# Patient Record
Sex: MALE | Race: WHITE | NOT HISPANIC OR LATINO | URBAN - METROPOLITAN AREA
[De-identification: names, ages, dates, MRNs, and addresses within clinical notes are randomized per-mention and may not be internally consistent; named-entity substitution may affect disease eponyms.]

---

## 2017-10-26 VITALS
HEART RATE: 68 BPM | OXYGEN SATURATION: 99 % | SYSTOLIC BLOOD PRESSURE: 125 MMHG | RESPIRATION RATE: 18 BRPM | HEIGHT: 71 IN | WEIGHT: 177.91 LBS | DIASTOLIC BLOOD PRESSURE: 80 MMHG | TEMPERATURE: 96 F

## 2017-10-26 NOTE — PATIENT PROFILE ADULT. - PMH
Acoustic neuroma    ADHD (attention deficit hyperactivity disorder)    Anxiety disorder    HTN (hypertension)

## 2017-10-27 ENCOUNTER — INPATIENT (INPATIENT)
Facility: HOSPITAL | Age: 52
LOS: 3 days | Discharge: HOME HEALTH | DRG: 27 | End: 2017-10-31
Attending: NEUROLOGICAL SURGERY | Admitting: NEUROLOGICAL SURGERY
Payer: COMMERCIAL

## 2017-10-27 DIAGNOSIS — I10 ESSENTIAL (PRIMARY) HYPERTENSION: ICD-10-CM

## 2017-10-27 DIAGNOSIS — F90.9 ATTENTION-DEFICIT HYPERACTIVITY DISORDER, UNSPECIFIED TYPE: ICD-10-CM

## 2017-10-27 DIAGNOSIS — F41.9 ANXIETY DISORDER, UNSPECIFIED: ICD-10-CM

## 2017-10-27 DIAGNOSIS — D33.3 BENIGN NEOPLASM OF CRANIAL NERVES: ICD-10-CM

## 2017-10-27 LAB
ALBUMIN SERPL ELPH-MCNC: 4.5 G/DL — SIGNIFICANT CHANGE UP (ref 3.3–5)
ALP SERPL-CCNC: 44 U/L — SIGNIFICANT CHANGE UP (ref 40–120)
ALT FLD-CCNC: 23 U/L — SIGNIFICANT CHANGE UP (ref 10–45)
ANION GAP SERPL CALC-SCNC: 13 MMOL/L — SIGNIFICANT CHANGE UP (ref 5–17)
AST SERPL-CCNC: 25 U/L — SIGNIFICANT CHANGE UP (ref 10–40)
BASOPHILS NFR BLD AUTO: 0.2 % — SIGNIFICANT CHANGE UP (ref 0–2)
BILIRUB SERPL-MCNC: 1 MG/DL — SIGNIFICANT CHANGE UP (ref 0.2–1.2)
BUN SERPL-MCNC: 14 MG/DL — SIGNIFICANT CHANGE UP (ref 7–23)
CALCIUM SERPL-MCNC: 9 MG/DL — SIGNIFICANT CHANGE UP (ref 8.4–10.5)
CHLORIDE SERPL-SCNC: 96 MMOL/L — SIGNIFICANT CHANGE UP (ref 96–108)
CO2 SERPL-SCNC: 23 MMOL/L — SIGNIFICANT CHANGE UP (ref 22–31)
CREAT SERPL-MCNC: 1.03 MG/DL — SIGNIFICANT CHANGE UP (ref 0.5–1.3)
EOSINOPHIL NFR BLD AUTO: 0.2 % — SIGNIFICANT CHANGE UP (ref 0–6)
GLUCOSE BLDC GLUCOMTR-MCNC: 138 MG/DL — HIGH (ref 70–99)
GLUCOSE BLDC GLUCOMTR-MCNC: 143 MG/DL — HIGH (ref 70–99)
GLUCOSE SERPL-MCNC: 162 MG/DL — HIGH (ref 70–99)
HCT VFR BLD CALC: 39.2 % — SIGNIFICANT CHANGE UP (ref 39–50)
HGB BLD-MCNC: 14.1 G/DL — SIGNIFICANT CHANGE UP (ref 13–17)
LYMPHOCYTES # BLD AUTO: 16.1 % — SIGNIFICANT CHANGE UP (ref 13–44)
MAGNESIUM SERPL-MCNC: 1.6 MG/DL — SIGNIFICANT CHANGE UP (ref 1.6–2.6)
MCHC RBC-ENTMCNC: 33 PG — SIGNIFICANT CHANGE UP (ref 27–34)
MCHC RBC-ENTMCNC: 36 G/DL — SIGNIFICANT CHANGE UP (ref 32–36)
MCV RBC AUTO: 91.8 FL — SIGNIFICANT CHANGE UP (ref 80–100)
MONOCYTES NFR BLD AUTO: 1 % — LOW (ref 2–14)
NEUTROPHILS NFR BLD AUTO: 82.5 % — HIGH (ref 43–77)
PHOSPHATE SERPL-MCNC: 3.3 MG/DL — SIGNIFICANT CHANGE UP (ref 2.5–4.5)
PLATELET # BLD AUTO: 164 K/UL — SIGNIFICANT CHANGE UP (ref 150–400)
POTASSIUM SERPL-MCNC: 4.2 MMOL/L — SIGNIFICANT CHANGE UP (ref 3.5–5.3)
POTASSIUM SERPL-SCNC: 4.2 MMOL/L — SIGNIFICANT CHANGE UP (ref 3.5–5.3)
PROT SERPL-MCNC: 7.2 G/DL — SIGNIFICANT CHANGE UP (ref 6–8.3)
RBC # BLD: 4.27 M/UL — SIGNIFICANT CHANGE UP (ref 4.2–5.8)
RBC # FLD: 12.1 % — SIGNIFICANT CHANGE UP (ref 10.3–16.9)
SODIUM SERPL-SCNC: 132 MMOL/L — LOW (ref 135–145)
WBC # BLD: 5.1 K/UL — SIGNIFICANT CHANGE UP (ref 3.8–10.5)
WBC # FLD AUTO: 5.1 K/UL — SIGNIFICANT CHANGE UP (ref 3.8–10.5)

## 2017-10-27 PROCEDURE — 99233 SBSQ HOSP IP/OBS HIGH 50: CPT

## 2017-10-27 RX ORDER — GLUCAGON INJECTION, SOLUTION 0.5 MG/.1ML
1 INJECTION, SOLUTION SUBCUTANEOUS ONCE
Qty: 0 | Refills: 0 | Status: DISCONTINUED | OUTPATIENT
Start: 2017-10-27 | End: 2017-10-31

## 2017-10-27 RX ORDER — DEXTROSE 50 % IN WATER 50 %
25 SYRINGE (ML) INTRAVENOUS ONCE
Qty: 0 | Refills: 0 | Status: DISCONTINUED | OUTPATIENT
Start: 2017-10-27 | End: 2017-10-31

## 2017-10-27 RX ORDER — DEXAMETHASONE 0.5 MG/5ML
4 ELIXIR ORAL EVERY 6 HOURS
Qty: 0 | Refills: 0 | Status: DISCONTINUED | OUTPATIENT
Start: 2017-10-27 | End: 2017-10-28

## 2017-10-27 RX ORDER — ALPRAZOLAM 0.25 MG
1 TABLET ORAL
Qty: 0 | Refills: 0 | COMMUNITY

## 2017-10-27 RX ORDER — MAGNESIUM SULFATE 500 MG/ML
2 VIAL (ML) INJECTION ONCE
Qty: 0 | Refills: 0 | Status: COMPLETED | OUTPATIENT
Start: 2017-10-27 | End: 2017-10-27

## 2017-10-27 RX ORDER — METOPROLOL TARTRATE 50 MG
50 TABLET ORAL
Qty: 0 | Refills: 0 | Status: DISCONTINUED | OUTPATIENT
Start: 2017-10-27 | End: 2017-10-28

## 2017-10-27 RX ORDER — DOCUSATE SODIUM 100 MG
100 CAPSULE ORAL THREE TIMES A DAY
Qty: 0 | Refills: 0 | Status: DISCONTINUED | OUTPATIENT
Start: 2017-10-27 | End: 2017-10-31

## 2017-10-27 RX ORDER — ALPRAZOLAM 0.25 MG
2 TABLET ORAL ONCE
Qty: 0 | Refills: 0 | Status: DISCONTINUED | OUTPATIENT
Start: 2017-10-27 | End: 2017-10-27

## 2017-10-27 RX ORDER — BUPROPION HYDROCHLORIDE 150 MG/1
1 TABLET, EXTENDED RELEASE ORAL
Qty: 0 | Refills: 0 | COMMUNITY

## 2017-10-27 RX ORDER — LISDEXAMFETAMINE DIMESYLATE 70 MG/1
1 CAPSULE ORAL
Qty: 0 | Refills: 0 | COMMUNITY

## 2017-10-27 RX ORDER — DEXTROSE 50 % IN WATER 50 %
1 SYRINGE (ML) INTRAVENOUS ONCE
Qty: 0 | Refills: 0 | Status: DISCONTINUED | OUTPATIENT
Start: 2017-10-27 | End: 2017-10-31

## 2017-10-27 RX ORDER — ONDANSETRON 8 MG/1
4 TABLET, FILM COATED ORAL EVERY 6 HOURS
Qty: 0 | Refills: 0 | Status: DISCONTINUED | OUTPATIENT
Start: 2017-10-27 | End: 2017-10-31

## 2017-10-27 RX ORDER — ACETAMINOPHEN 500 MG
650 TABLET ORAL EVERY 6 HOURS
Qty: 0 | Refills: 0 | Status: DISCONTINUED | OUTPATIENT
Start: 2017-10-27 | End: 2017-10-31

## 2017-10-27 RX ORDER — VALPROIC ACID (AS SODIUM SALT) 250 MG/5ML
500 SOLUTION, ORAL ORAL EVERY 8 HOURS
Qty: 0 | Refills: 0 | Status: DISCONTINUED | OUTPATIENT
Start: 2017-10-27 | End: 2017-10-28

## 2017-10-27 RX ORDER — ACETAMINOPHEN 500 MG
1000 TABLET ORAL ONCE
Qty: 0 | Refills: 0 | Status: COMPLETED | OUTPATIENT
Start: 2017-10-27 | End: 2017-10-27

## 2017-10-27 RX ORDER — PANTOPRAZOLE SODIUM 20 MG/1
40 TABLET, DELAYED RELEASE ORAL DAILY
Qty: 0 | Refills: 0 | Status: DISCONTINUED | OUTPATIENT
Start: 2017-10-27 | End: 2017-10-29

## 2017-10-27 RX ORDER — INSULIN LISPRO 100/ML
VIAL (ML) SUBCUTANEOUS
Qty: 0 | Refills: 0 | Status: DISCONTINUED | OUTPATIENT
Start: 2017-10-27 | End: 2017-10-31

## 2017-10-27 RX ORDER — FENTANYL CITRATE 50 UG/ML
50 INJECTION INTRAVENOUS ONCE
Qty: 0 | Refills: 0 | Status: DISCONTINUED | OUTPATIENT
Start: 2017-10-27 | End: 2017-10-27

## 2017-10-27 RX ORDER — ZOLPIDEM TARTRATE 10 MG/1
5 TABLET ORAL AT BEDTIME
Qty: 0 | Refills: 0 | Status: DISCONTINUED | OUTPATIENT
Start: 2017-10-27 | End: 2017-10-30

## 2017-10-27 RX ORDER — BUPROPION HYDROCHLORIDE 150 MG/1
150 TABLET, EXTENDED RELEASE ORAL DAILY
Qty: 0 | Refills: 0 | Status: DISCONTINUED | OUTPATIENT
Start: 2017-10-27 | End: 2017-10-31

## 2017-10-27 RX ORDER — TRAMADOL HYDROCHLORIDE 50 MG/1
25 TABLET ORAL EVERY 8 HOURS
Qty: 0 | Refills: 0 | Status: DISCONTINUED | OUTPATIENT
Start: 2017-10-27 | End: 2017-10-29

## 2017-10-27 RX ORDER — CEFAZOLIN SODIUM 1 G
1000 VIAL (EA) INJECTION EVERY 8 HOURS
Qty: 0 | Refills: 0 | Status: DISCONTINUED | OUTPATIENT
Start: 2017-10-27 | End: 2017-10-28

## 2017-10-27 RX ORDER — METOPROLOL TARTRATE 50 MG
1 TABLET ORAL
Qty: 0 | Refills: 0 | COMMUNITY

## 2017-10-27 RX ORDER — SENNA PLUS 8.6 MG/1
2 TABLET ORAL AT BEDTIME
Qty: 0 | Refills: 0 | Status: DISCONTINUED | OUTPATIENT
Start: 2017-10-27 | End: 2017-10-31

## 2017-10-27 RX ORDER — FENTANYL CITRATE 50 UG/ML
25 INJECTION INTRAVENOUS ONCE
Qty: 0 | Refills: 0 | Status: DISCONTINUED | OUTPATIENT
Start: 2017-10-27 | End: 2017-10-27

## 2017-10-27 RX ORDER — ALPRAZOLAM 0.25 MG
1 TABLET ORAL
Qty: 0 | Refills: 0 | Status: DISCONTINUED | OUTPATIENT
Start: 2017-10-27 | End: 2017-10-31

## 2017-10-27 RX ORDER — DEXTROSE 50 % IN WATER 50 %
12.5 SYRINGE (ML) INTRAVENOUS ONCE
Qty: 0 | Refills: 0 | Status: DISCONTINUED | OUTPATIENT
Start: 2017-10-27 | End: 2017-10-31

## 2017-10-27 RX ORDER — SODIUM CHLORIDE 9 MG/ML
1000 INJECTION, SOLUTION INTRAVENOUS
Qty: 0 | Refills: 0 | Status: DISCONTINUED | OUTPATIENT
Start: 2017-10-27 | End: 2017-10-31

## 2017-10-27 RX ORDER — SODIUM CHLORIDE 9 MG/ML
1000 INJECTION INTRAMUSCULAR; INTRAVENOUS; SUBCUTANEOUS
Qty: 0 | Refills: 0 | Status: DISCONTINUED | OUTPATIENT
Start: 2017-10-27 | End: 2017-10-28

## 2017-10-27 RX ADMIN — Medication 2 MILLIGRAM(S): at 18:50

## 2017-10-27 RX ADMIN — Medication 50 GRAM(S): at 18:01

## 2017-10-27 RX ADMIN — FENTANYL CITRATE 50 MICROGRAM(S): 50 INJECTION INTRAVENOUS at 15:30

## 2017-10-27 RX ADMIN — Medication 50 MILLIGRAM(S): at 18:02

## 2017-10-27 RX ADMIN — Medication 100 MILLIGRAM(S): at 19:05

## 2017-10-27 RX ADMIN — TRAMADOL HYDROCHLORIDE 25 MILLIGRAM(S): 50 TABLET ORAL at 23:18

## 2017-10-27 RX ADMIN — Medication 400 MILLIGRAM(S): at 17:53

## 2017-10-27 RX ADMIN — FENTANYL CITRATE 50 MICROGRAM(S): 50 INJECTION INTRAVENOUS at 15:18

## 2017-10-27 RX ADMIN — Medication 27.5 MILLIGRAM(S): at 22:16

## 2017-10-27 RX ADMIN — Medication 100 MILLIGRAM(S): at 22:16

## 2017-10-27 RX ADMIN — Medication 4 MILLIGRAM(S): at 18:02

## 2017-10-27 RX ADMIN — Medication 1000 MILLIGRAM(S): at 18:11

## 2017-10-27 RX ADMIN — ZOLPIDEM TARTRATE 5 MILLIGRAM(S): 10 TABLET ORAL at 23:13

## 2017-10-27 RX ADMIN — SENNA PLUS 2 TABLET(S): 8.6 TABLET ORAL at 22:16

## 2017-10-27 RX ADMIN — FENTANYL CITRATE 25 MICROGRAM(S): 50 INJECTION INTRAVENOUS at 13:47

## 2017-10-27 RX ADMIN — PANTOPRAZOLE SODIUM 40 MILLIGRAM(S): 20 TABLET, DELAYED RELEASE ORAL at 18:11

## 2017-10-27 RX ADMIN — FENTANYL CITRATE 25 MICROGRAM(S): 50 INJECTION INTRAVENOUS at 14:17

## 2017-10-27 NOTE — PROGRESS NOTE ADULT - SUBJECTIVE AND OBJECTIVE BOX
ENT Post Op Check    Procedure: Left retrosigmoid approach to left acoustic neuroma resection  Interval: no acute events. complains of headache and pain at incision site. Feels slightly foggy. minimal nausea. otherwise doing well.    PE:  AOx3  NAD. comfortable  no increased WOB. no stridor  Neck soft, flat  mastoid dressing in place. c/d/i  Face HB1/6. fulll eye closure  NUNO    A/P: 54M s/p left retrosigmoid approach to a resection of acoustic neuroma  - Mastoid off POD3  - steroids  - abx  - care per NSGY and SICU

## 2017-10-27 NOTE — H&P PST ADULT - NEUROLOGICAL COMMENTS
AAOx3. PERRL. EOMI. VF intact. mild left facial asymmetry and left side hearing loss o/w CNs intact. 5/5 str x4 exts. Sensation intact throughout. Normoreflexic. Following commands.

## 2017-10-27 NOTE — PROGRESS NOTE ADULT - SUBJECTIVE AND OBJECTIVE BOX
Neurosurgery POC:  S/P day zero Left retrosigmoid craniotomy for resection of acoustic tumor.  Patient doing well without complaint. Admits to left scalp incisional pain.    T(C): 36.2 (10-27-17 @ 15:46), Max: 36.6 (10-27-17 @ 13:18)  HR: 82 (10-27-17 @ 18:04) (82 - 96)  BP: 133/84 (10-27-17 @ 14:03) (133/84 - 151/86)  RR: 18 (10-27-17 @ 18:04) (9 - 18)  SpO2: 99% (10-27-17 @ 18:04) (98% - 100%)  Wt(kg): --    Exam: ENT dressing in place, no facial edema  No facial palsy, No visual deficit  No pronator drift  No focal motor deficit    CBC Full  -  ( 27 Oct 2017 14:05 )  WBC Count : 5.1 K/uL  Hemoglobin : 14.1 g/dL  Hematocrit : 39.2 %  Platelet Count - Automated : 164 K/uL  Mean Cell Volume : 91.8 fL  Mean Cell Hemoglobin : 33.0 pg  Mean Cell Hemoglobin Concentration : 36.0 g/dL  Auto Neutrophil # : x  Auto Lymphocyte # : x  Auto Monocyte # : x  Auto Eosinophil # : x  Auto Basophil # : x  Auto Neutrophil % : 82.5 %  Auto Lymphocyte % : 16.1 %  Auto Monocyte % : 1.0 %  Auto Eosinophil % : 0.2 %  Auto Basophil % : 0.2 %    10-27    132<L>  |  96  |  14  ----------------------------<  162<H>  4.2   |  23  |  1.03    Ca    9.0      27 Oct 2017 14:05  Phos  3.3     10-27  Mg     1.6     10-27    TPro  7.2  /  Alb  4.5  /  TBili  1.0  /  DBili  x   /  AST  25  /  ALT  23  /  AlkPhos  44  10-27          Wound: dressing is dry and clean    Imaging: CT scan tonight    Assessment/Plan: 52 male POD#0 left retrosigmoid craniotomy for resection of acoustic tumor  Plan: CT scan tonight  decadron   regular diet  Head of bed 45 degrees.  SICU care  D/W Dr. Simms

## 2017-10-27 NOTE — H&P PST ADULT - HISTORY OF PRESENT ILLNESS
52 year old male with PMH of HTN, Anxiety, ADHD with severe migraine headaches since April 2017 with outpatient work-up revealing a left sided acoustic mass likely neuroma. Patient also reports progressive hearing loss on left side. Patient's family at bedside report some asymmetry of his face and stuttering speech. Patient denies any recent headaches however, and denies any visual changes, extremity weakness or numbness, urinary/bowel changes, or gait disturbance. Denies any chest pain, SOB, seizures, syncope or other constitutional symptoms. MRI Brain from April, July and October with patient on disc. History of ASA and Motrin use, not used in the past week.

## 2017-10-27 NOTE — H&P PST ADULT - PROBLEM SELECTOR PLAN 3
Will ask family to bring home Vyvanase, not formulary at [facility]

## 2017-10-28 LAB
ANION GAP SERPL CALC-SCNC: 14 MMOL/L — SIGNIFICANT CHANGE UP (ref 5–17)
BUN SERPL-MCNC: 12 MG/DL — SIGNIFICANT CHANGE UP (ref 7–23)
CALCIUM SERPL-MCNC: 8.5 MG/DL — SIGNIFICANT CHANGE UP (ref 8.4–10.5)
CHLORIDE SERPL-SCNC: 99 MMOL/L — SIGNIFICANT CHANGE UP (ref 96–108)
CO2 SERPL-SCNC: 20 MMOL/L — LOW (ref 22–31)
CREAT SERPL-MCNC: 0.86 MG/DL — SIGNIFICANT CHANGE UP (ref 0.5–1.3)
GLUCOSE BLDC GLUCOMTR-MCNC: 142 MG/DL — HIGH (ref 70–99)
GLUCOSE BLDC GLUCOMTR-MCNC: 145 MG/DL — HIGH (ref 70–99)
GLUCOSE BLDC GLUCOMTR-MCNC: 151 MG/DL — HIGH (ref 70–99)
GLUCOSE BLDC GLUCOMTR-MCNC: 166 MG/DL — HIGH (ref 70–99)
GLUCOSE SERPL-MCNC: 146 MG/DL — HIGH (ref 70–99)
HCT VFR BLD CALC: 36.7 % — LOW (ref 39–50)
HGB BLD-MCNC: 13.5 G/DL — SIGNIFICANT CHANGE UP (ref 13–17)
MAGNESIUM SERPL-MCNC: 2.2 MG/DL — SIGNIFICANT CHANGE UP (ref 1.6–2.6)
MCHC RBC-ENTMCNC: 33.2 PG — SIGNIFICANT CHANGE UP (ref 27–34)
MCHC RBC-ENTMCNC: 36.8 G/DL — HIGH (ref 32–36)
MCV RBC AUTO: 90.2 FL — SIGNIFICANT CHANGE UP (ref 80–100)
PHOSPHATE SERPL-MCNC: 2.1 MG/DL — LOW (ref 2.5–4.5)
PLATELET # BLD AUTO: 153 K/UL — SIGNIFICANT CHANGE UP (ref 150–400)
POTASSIUM SERPL-MCNC: 4 MMOL/L — SIGNIFICANT CHANGE UP (ref 3.5–5.3)
POTASSIUM SERPL-SCNC: 4 MMOL/L — SIGNIFICANT CHANGE UP (ref 3.5–5.3)
RBC # BLD: 4.07 M/UL — LOW (ref 4.2–5.8)
RBC # FLD: 11.8 % — SIGNIFICANT CHANGE UP (ref 10.3–16.9)
SODIUM SERPL-SCNC: 133 MMOL/L — LOW (ref 135–145)
WBC # BLD: 9.5 K/UL — SIGNIFICANT CHANGE UP (ref 3.8–10.5)
WBC # FLD AUTO: 9.5 K/UL — SIGNIFICANT CHANGE UP (ref 3.8–10.5)

## 2017-10-28 PROCEDURE — 70450 CT HEAD/BRAIN W/O DYE: CPT | Mod: 26

## 2017-10-28 PROCEDURE — 99233 SBSQ HOSP IP/OBS HIGH 50: CPT

## 2017-10-28 RX ORDER — DIVALPROEX SODIUM 500 MG/1
500 TABLET, DELAYED RELEASE ORAL EVERY 8 HOURS
Qty: 0 | Refills: 0 | Status: DISCONTINUED | OUTPATIENT
Start: 2017-10-28 | End: 2017-10-28

## 2017-10-28 RX ORDER — CEFAZOLIN SODIUM 1 G
1000 VIAL (EA) INJECTION ONCE
Qty: 0 | Refills: 0 | Status: COMPLETED | OUTPATIENT
Start: 2017-10-28 | End: 2017-10-28

## 2017-10-28 RX ORDER — ENOXAPARIN SODIUM 100 MG/ML
40 INJECTION SUBCUTANEOUS AT BEDTIME
Qty: 0 | Refills: 0 | Status: DISCONTINUED | OUTPATIENT
Start: 2017-10-28 | End: 2017-10-31

## 2017-10-28 RX ORDER — ACETAMINOPHEN 500 MG
1000 TABLET ORAL ONCE
Qty: 0 | Refills: 0 | Status: COMPLETED | OUTPATIENT
Start: 2017-10-28 | End: 2017-10-28

## 2017-10-28 RX ORDER — DEXAMETHASONE 0.5 MG/5ML
4 ELIXIR ORAL EVERY 6 HOURS
Qty: 0 | Refills: 0 | Status: DISCONTINUED | OUTPATIENT
Start: 2017-10-28 | End: 2017-10-30

## 2017-10-28 RX ORDER — NICOTINE POLACRILEX 2 MG
1 GUM BUCCAL DAILY
Qty: 0 | Refills: 0 | Status: DISCONTINUED | OUTPATIENT
Start: 2017-10-28 | End: 2017-10-29

## 2017-10-28 RX ORDER — METOPROLOL TARTRATE 50 MG
50 TABLET ORAL ONCE
Qty: 0 | Refills: 0 | Status: COMPLETED | OUTPATIENT
Start: 2017-10-28 | End: 2017-10-28

## 2017-10-28 RX ORDER — METOPROLOL TARTRATE 50 MG
100 TABLET ORAL DAILY
Qty: 0 | Refills: 0 | Status: DISCONTINUED | OUTPATIENT
Start: 2017-10-29 | End: 2017-10-31

## 2017-10-28 RX ADMIN — TRAMADOL HYDROCHLORIDE 25 MILLIGRAM(S): 50 TABLET ORAL at 13:55

## 2017-10-28 RX ADMIN — Medication 650 MILLIGRAM(S): at 05:50

## 2017-10-28 RX ADMIN — Medication 650 MILLIGRAM(S): at 06:23

## 2017-10-28 RX ADMIN — Medication 1 PATCH: at 11:09

## 2017-10-28 RX ADMIN — Medication 27.5 MILLIGRAM(S): at 05:46

## 2017-10-28 RX ADMIN — TRAMADOL HYDROCHLORIDE 25 MILLIGRAM(S): 50 TABLET ORAL at 00:35

## 2017-10-28 RX ADMIN — ENOXAPARIN SODIUM 40 MILLIGRAM(S): 100 INJECTION SUBCUTANEOUS at 22:15

## 2017-10-28 RX ADMIN — PANTOPRAZOLE SODIUM 40 MILLIGRAM(S): 20 TABLET, DELAYED RELEASE ORAL at 11:09

## 2017-10-28 RX ADMIN — Medication 100 MILLIGRAM(S): at 05:46

## 2017-10-28 RX ADMIN — Medication 4 MILLIGRAM(S): at 05:46

## 2017-10-28 RX ADMIN — Medication 1 MILLIGRAM(S): at 18:55

## 2017-10-28 RX ADMIN — Medication 400 MILLIGRAM(S): at 17:30

## 2017-10-28 RX ADMIN — Medication 50 MILLIGRAM(S): at 11:09

## 2017-10-28 RX ADMIN — Medication 4 MILLIGRAM(S): at 00:35

## 2017-10-28 RX ADMIN — Medication 2: at 13:03

## 2017-10-28 RX ADMIN — Medication 4 MILLIGRAM(S): at 23:50

## 2017-10-28 RX ADMIN — Medication 50 MILLIGRAM(S): at 05:46

## 2017-10-28 RX ADMIN — BUPROPION HYDROCHLORIDE 150 MILLIGRAM(S): 150 TABLET, EXTENDED RELEASE ORAL at 11:09

## 2017-10-28 RX ADMIN — Medication 4 MILLIGRAM(S): at 17:31

## 2017-10-28 RX ADMIN — Medication 2: at 22:15

## 2017-10-28 RX ADMIN — Medication 100 MILLIGRAM(S): at 11:10

## 2017-10-28 RX ADMIN — ZOLPIDEM TARTRATE 5 MILLIGRAM(S): 10 TABLET ORAL at 22:15

## 2017-10-28 RX ADMIN — Medication 100 MILLIGRAM(S): at 03:42

## 2017-10-28 RX ADMIN — Medication 63.75 MILLIMOLE(S): at 06:24

## 2017-10-28 RX ADMIN — Medication 100 MILLIGRAM(S): at 17:31

## 2017-10-28 RX ADMIN — TRAMADOL HYDROCHLORIDE 25 MILLIGRAM(S): 50 TABLET ORAL at 13:08

## 2017-10-28 RX ADMIN — Medication 100 MILLIGRAM(S): at 22:15

## 2017-10-28 RX ADMIN — Medication 1 MILLIGRAM(S): at 05:48

## 2017-10-28 RX ADMIN — SENNA PLUS 2 TABLET(S): 8.6 TABLET ORAL at 22:15

## 2017-10-28 RX ADMIN — Medication 1000 MILLIGRAM(S): at 18:18

## 2017-10-28 RX ADMIN — Medication 4 MILLIGRAM(S): at 11:09

## 2017-10-28 NOTE — OCCUPATIONAL THERAPY INITIAL EVALUATION ADULT - MANUAL MUSCLE TESTING RESULTS, REHAB EVAL
mild left facial droop, tongue protrusion in midline, cheeks puff and eyebrows elevation grossly intact; bilateral upper extremities 5/5 throughout

## 2017-10-28 NOTE — OCCUPATIONAL THERAPY INITIAL EVALUATION ADULT - COORDINATION ASSESSED, REHAB EVAL
right upper extremity grossly intact; mild tremor left upper extremity however remains functional/finger to nose

## 2017-10-28 NOTE — PROGRESS NOTE ADULT - ASSESSMENT
Assessment/Plan: 52 male POD#1 left retrosigmoid craniotomy for resection of acoustic tumor, w intractable pain.   Plan: CT scan tonight  cont Tramadol, Fioricet, stop Valproic Acid, stop fentanyl  decadron   regular diet  OOBTC  transfer to stepdown

## 2017-10-28 NOTE — PROGRESS NOTE ADULT - SUBJECTIVE AND OBJECTIVE BOX
Neurosurgery POC:  S/P day zero Left retrosigmoid craniotomy for resection of acoustic tumor.  Patient doing well without complaint. Admits to left scalp incisional pain.      =================================  NEUROCRITICAL CARE ATTENDING NOTE  =================================    KAROL CHAMBERS   [id number]  Summary:  52y/M  HPI:  52 year old male with PMH of HTN, Anxiety, ADHD with severe migraine headaches since April 2017 with outpatient work-up revealing a left sided acoustic mass likely neuroma. Patient also reports progressive hearing loss on left side. Patient's family at bedside report some asymmetry of his face and stuttering speech. Patient denies any recent headaches however, and denies any visual changes, extremity weakness or numbness, urinary/bowel changes, or gait disturbance. Denies any chest pain, SOB, seizures, syncope or other constitutional symptoms. MRI Brain from April, July and October with patient on disc. History of ASA and Motrin use, not used in the past week. (27 Oct 2017 07:39)    Overnight Events:  Denies HA/N/V/Dizziness.  Pain much better controlled.  Not asking for narcotics. Wants metoprolol tartrate 100 mg once per day.     PHYSICAL EXAMINATION  T(C): 36.4 (10-28 @ 13:52), Max: 36.4 (10-28 @ 13:52)  HR: 106 (10-28 @ 17:00) (58 - 106)  BP: 138/86 (10-28 @ 17:00) (115/60 - 140/75)  RR: 25 (10-28 @ 17:00) (8 - 26)  SpO2: 95% (10-28 @ 17:00) (95% - 100%)       NEURO       MS:   A&Ox 4       CN:  L face palsy, chronic       MOTOR:  5/5 x 4       COORD: wnl  LABS:  CAPILLARY BLOOD GLUCOSE  145 (28 Oct 2017 07:00)  138 (27 Oct 2017 23:00)      POCT Blood Glucose.: 142 mg/dL (28 Oct 2017 15:54)  POCT Blood Glucose.: 166 mg/dL (28 Oct 2017 12:08)  POCT Blood Glucose.: 145 mg/dL (28 Oct 2017 06:31)  POCT Blood Glucose.: 138 mg/dL (27 Oct 2017 22:37)                          13.5   9.5   )-----------( 153      ( 28 Oct 2017 04:24 )             36.7     10-28    133<L>  |  99  |  12  ----------------------------<  146<H>  4.0   |  20<L>  |  0.86    Ca    8.5      28 Oct 2017 04:24  Phos  2.1     10-28  Mg     2.2     10-28    TPro  7.2  /  Alb  4.5  /  TBili  1.0  /  DBili  x   /  AST  25  /  ALT  23  /  AlkPhos  44  10-27      10-27 @ 07:01  -  10-28 @ 07:00  --------------------------------------------------------  IN: 1950 mL / OUT: 880 mL / NET: 1070 mL    10-28 @ 07:01  -  10-28 @ 17:50  --------------------------------------------------------  IN: 850 mL / OUT: 2300 mL / NET: -1450 mL    MEDICATIONS:  MEDICATIONS  (STANDING):  ALPRAZolam 1 milliGRAM(s) Oral two times a day  buPROPion XL . 150 milliGRAM(s) Oral daily  dexamethasone     Tablet 4 milliGRAM(s) Oral every 6 hours  docusate sodium 100 milliGRAM(s) Oral three times a day  enoxaparin Injectable 40 milliGRAM(s) SubCutaneous at bedtime  insulin lispro (HumaLOG) corrective regimen sliding scale   SubCutaneous Before meals and at bedtime  nicotine -  14 mG/24Hr(s) Patch 1 patch Transdermal daily  pantoprazole  Injectable 40 milliGRAM(s) IV Push daily  senna 2 Tablet(s) Oral at bedtime  Vyvanse cap 30 milliGRAM(s) 30 milliGRAM(s) Oral daily    MEDICATIONS  (PRN):  acetaminophen   Tablet 650 milliGRAM(s) Oral every 6 hours PRN For Temp greater than 38.5 C (101.3 F)  acetaminophen   Tablet. 650 milliGRAM(s) Oral every 6 hours PRN Moderate Pain (4 - 6)  dextrose Gel 1 Dose(s) Oral once PRN Blood Glucose LESS THAN 70 milliGRAM(s)/deciliter  glucagon  Injectable 1 milliGRAM(s) IntraMuscular once PRN Glucose LESS THAN 70 milligrams/deciliter  ondansetron Injectable 4 milliGRAM(s) IV Push every 6 hours PRN Nausea and/or Vomiting  traMADol 25 milliGRAM(s) Oral every 8 hours PRN Severe Pain (7 - 10)  zolpidem 5 milliGRAM(s) Oral at bedtime PRN Insomnia

## 2017-10-28 NOTE — PROGRESS NOTE ADULT - SUBJECTIVE AND OBJECTIVE BOX
S/Overnight events:  no acute events.  No new complaints.  States hearing on left side slightly improved      Hospital Course:   POD# 0: s/p crani for resection of left acoustic neuroma.  Overnight in ICU, no acute events      Vital Signs Last 24 Hrs  T(C): 35.6 (28 Oct 2017 05:41), Max: 36.6 (27 Oct 2017 13:18)  T(F): 96.1 (28 Oct 2017 05:41), Max: 97.8 (27 Oct 2017 13:18)  HR: 92 (28 Oct 2017 07:00) (58 - 96)  BP: 119/71 (28 Oct 2017 07:00) (119/71 - 151/86)  BP(mean): 89 (28 Oct 2017 07:00) (89 - 109)  RR: 12 (28 Oct 2017 07:00) (8 - 26)  SpO2: 95% (28 Oct 2017 07:00) (95% - 100%)    I&O's Detail    27 Oct 2017 07:01  -  28 Oct 2017 07:00  --------------------------------------------------------  IN:    IV PiggyBack: 350 mL    Oral Fluid: 300 mL    sodium chloride 0.9%.: 1300 mL  Total IN: 1950 mL    OUT:    Indwelling Catheter - Urethral: 880 mL  Total OUT: 880 mL    Total NET: 1070 mL        I&O's Summary    27 Oct 2017 07:01  -  28 Oct 2017 07:00  --------------------------------------------------------  IN: 1950 mL / OUT: 880 mL / NET: 1070 mL        PHYSICAL EXAM:  Neurological:  A/Ox3, FC, mild dysarthria but improved from baseline according to patient  NUNO 5/5 strength  Sensation intact light touch all dermatomes  Left ear hearing intact  Mild left facial droop at baseline, cranial nerves otherwise intact  Incision/Wound: C/D/I, +staples    TUBES/LINES:  [] CVC  [] A-line  [] Lumbar Drain  [] Ventriculostomy  [] Other    DIET:  [] NPO  [x] Mechanical  [] Tube feeds    LABS:                        13.5   9.5   )-----------( 153      ( 28 Oct 2017 04:24 )             36.7     10-28    133<L>  |  99  |  12  ----------------------------<  146<H>  4.0   |  20<L>  |  0.86    Ca    8.5      28 Oct 2017 04:24  Phos  2.1     10-28  Mg     2.2     10-28    TPro  7.2  /  Alb  4.5  /  TBili  1.0  /  DBili  x   /  AST  25  /  ALT  23  /  AlkPhos  44  10-27            CAPILLARY BLOOD GLUCOSE  145 (28 Oct 2017 07:00)  138 (27 Oct 2017 23:00)      POCT Blood Glucose.: 145 mg/dL (28 Oct 2017 06:31)  POCT Blood Glucose.: 138 mg/dL (27 Oct 2017 22:37)  POCT Blood Glucose.: 143 mg/dL (27 Oct 2017 16:18)      Drug Levels: [] N/A    CSF Analysis: [] N/A      Allergies    No Known Allergies    Intolerances      MEDICATIONS:  Antibiotics:  ceFAZolin   IVPB 1000 milliGRAM(s) IV Intermittent once    Neuro:  acetaminophen   Tablet 650 milliGRAM(s) Oral every 6 hours PRN  acetaminophen   Tablet. 650 milliGRAM(s) Oral every 6 hours PRN  ALPRAZolam 1 milliGRAM(s) Oral two times a day  buPROPion XL . 150 milliGRAM(s) Oral daily  diVALproex  milliGRAM(s) Oral every 8 hours  ondansetron Injectable 4 milliGRAM(s) IV Push every 6 hours PRN  traMADol 25 milliGRAM(s) Oral every 8 hours PRN  zolpidem 5 milliGRAM(s) Oral at bedtime PRN    Anticoagulation:    OTHER:  dexamethasone     Tablet 4 milliGRAM(s) Oral every 6 hours  dextrose 50% Injectable 12.5 Gram(s) IV Push once  dextrose 50% Injectable 25 Gram(s) IV Push once  dextrose 50% Injectable 25 Gram(s) IV Push once  dextrose Gel 1 Dose(s) Oral once PRN  docusate sodium 100 milliGRAM(s) Oral three times a day  glucagon  Injectable 1 milliGRAM(s) IntraMuscular once PRN  insulin lispro (HumaLOG) corrective regimen sliding scale   SubCutaneous Before meals and at bedtime  metoprolol     tartrate 50 milliGRAM(s) Oral two times a day  pantoprazole  Injectable 40 milliGRAM(s) IV Push daily  senna 2 Tablet(s) Oral at bedtime  Vyvanse cap 30 milliGRAM(s) 30 milliGRAM(s) Oral daily    IVF:  dextrose 5%. 1000 milliLiter(s) IV Continuous <Continuous>  sodium chloride 0.9%. 1000 milliLiter(s) IV Continuous <Continuous>    CULTURES:    RADIOLOGY & ADDITIONAL TESTS: postop CT brain from 10/27 with expected postop changes      ASSESSMENT:  52y Male s/p left crani for acoustic neuroma resection, POD#1    CEREBELLOPONTINE ANGLE L  CEREBELLOPONTINE ANGLE LE  CEREBELLOPONTINE ANGLE LESION  No h/o HF  Unknown h/o HF  No pertinent family history in first degree relatives  Handoff  Anxiety disorder  ADHD (attention deficit hyperactivity disorder)  HTN (hypertension)  Acoustic neuroma  Acoustic nerve cancer, left  Acoustic nerve cancer of left ear  Anxiety disorder  ADHD (attention deficit hyperactivity disorder)  HTN (hypertension)  Acoustic neuroma  Craniotomy  No significant past surgical history      PLAN:  NEURO: Continue neuro checks.  Continue decadron 4q6h.  Continue postop depakote.  ENT follow up appreciated, headwrap per ENT    CARDIOVASCULAR:  Normotensive BP goals.  Continue metoprolol BID    PULMONARY: on room air, no issues    RENAL:  IVL when tolerated PO    GI: Advance to full regular diet    HEME: H/H stable, no issues    ID:  Ancef x 1 more dose for postop prophylaxis    ENDO: Continue sliding scale while on Decadron    DVT PROPHYLAXIS:  [x] Venodynes                                [] Heparin/Lovenox    FALL RISK:  [x] Low Risk                                    [] Impulsive    DISPOSITION: PT/OT/OOB today.  Transfer to SDU today.  D/W Dr. Simms

## 2017-10-28 NOTE — PROGRESS NOTE ADULT - SUBJECTIVE AND OBJECTIVE BOX
ENT [facility] DAILY PROGRESS NOTE    Overnight events/Interval HPI: 52y Male POD1 s/p left retrosigmoid approach for resection of acoustic neuroma. No acute events overnight. Doing very well.       Allergies    No Known Allergies    Intolerances    MEDICATIONS:  Antiinfectives:     IV fluids:  dextrose 5%. 1000 milliLiter(s) IV Continuous <Continuous>    Hematologic/Anticoagulation:  enoxaparin Injectable 40 milliGRAM(s) SubCutaneous at bedtime    Pain medications/Neuro:  acetaminophen   Tablet 650 milliGRAM(s) Oral every 6 hours PRN  acetaminophen   Tablet. 650 milliGRAM(s) Oral every 6 hours PRN  ALPRAZolam 1 milliGRAM(s) Oral two times a day  buPROPion XL . 150 milliGRAM(s) Oral daily  ondansetron Injectable 4 milliGRAM(s) IV Push every 6 hours PRN  traMADol 25 milliGRAM(s) Oral every 8 hours PRN  zolpidem 5 milliGRAM(s) Oral at bedtime PRN    Endocrine Medications:   dexamethasone     Tablet 4 milliGRAM(s) Oral every 6 hours  dextrose 50% Injectable 12.5 Gram(s) IV Push once  dextrose 50% Injectable 25 Gram(s) IV Push once  dextrose 50% Injectable 25 Gram(s) IV Push once  dextrose Gel 1 Dose(s) Oral once PRN  glucagon  Injectable 1 milliGRAM(s) IntraMuscular once PRN  insulin lispro (HumaLOG) corrective regimen sliding scale   SubCutaneous Before meals and at bedtime    All other standing medications:   docusate sodium 100 milliGRAM(s) Oral three times a day  nicotine -  14 mG/24Hr(s) Patch 1 patch Transdermal daily  pantoprazole  Injectable 40 milliGRAM(s) IV Push daily  senna 2 Tablet(s) Oral at bedtime  Vyvanse cap 30 milliGRAM(s) 30 milliGRAM(s) Oral daily    All other PRN medications:      Vital Signs Last 24 Hrs  T(C): 35.7 (28 Oct 2017 09:34), Max: 36.6 (27 Oct 2017 13:18)  T(F): 96.3 (28 Oct 2017 09:34), Max: 97.8 (27 Oct 2017 13:18)  HR: 98 (28 Oct 2017 11:00) (58 - 100)  BP: 119/66 (28 Oct 2017 11:00) (115/69 - 151/86)  BP(mean): 83 (28 Oct 2017 11:00) (83 - 109)  RR: 15 (28 Oct 2017 11:00) (8 - 26)  SpO2: 98% (28 Oct 2017 11:00) (95% - 100%)      10-27 @ 07:01  -  10-28 @ 07:00  --------------------------------------------------------  IN:    IV PiggyBack: 350 mL    Oral Fluid: 300 mL    sodium chloride 0.9%: 1300 mL  Total IN: 1950 mL    OUT:    Indwelling Catheter - Urethral: 880 mL  Total OUT: 880 mL    Total NET: 1070 mL      10-28 @ 07:01  -  10-28 @ 11:59  --------------------------------------------------------  IN:    IV PiggyBack: 50 mL    Oral Fluid: 200 mL    sodium chloride 0.9%: 200 mL  Total IN: 450 mL    OUT:    Voided: 650 mL  Total OUT: 650 mL    Total NET: -200 mL      PHYSICAL EXAM:  NAD, AAOx3   Breathing comfortably on room air   Mastoid dressing falling off  Incision cleaned, mastoid dressing replaced   Face HB I/VI      LABS:  CBC-                        13.5   9.5   )-----------( 153      ( 28 Oct 2017 04:24 )             36.7     BMP/CMP-  28 Oct 2017 04:24    133    |  99     |  12     ----------------------------<  146    4.0     |  20     |  0.86     Ca    8.5        28 Oct 2017 04:24  Phos  2.1       28 Oct 2017 04:24  Mg     2.2       28 Oct 2017 04:24    TPro  7.2    /  Alb  4.5    /  TBili  1.0    /  DBili  x      /  AST  25     /  ALT  23     /  AlkPhos  44     27 Oct 2017 14:05    Coagulation Studies-    Endocrine Panel-  Calcium, Total Serum: 8.5 mg/dL (10-28 @ 04:24)  Calcium, Total Serum: 9.0 mg/dL (10-27 @ 14:05)        RADIOLOGY & ADDITIONAL STUDIES:  CT head: postoperative changes    Assessment/Plan:  52y Male s/p left retrosigmoid approach to resection of acoustic neuroma  - Mastoid dressing to stay in place until 10/30  - Steroids  - Abx  - Care per nsgy and SICU

## 2017-10-28 NOTE — PROGRESS NOTE ADULT - SUBJECTIVE AND OBJECTIVE BOX
Neurosurgery POC:  S/P day zero Left retrosigmoid craniotomy for resection of acoustic tumor.  Patient doing well without complaint. Admits to left scalp incisional pain.    T(C): 36.2 (10-27-17 @ 15:46), Max: 36.6 (10-27-17 @ 13:18)  HR: 82 (10-27-17 @ 18:04) (82 - 96)  BP: 133/84 (10-27-17 @ 14:03) (133/84 - 151/86)  RR: 18 (10-27-17 @ 18:04) (9 - 18)  SpO2: 99% (10-27-17 @ 18:04) (98% - 100%)  Wt(kg): --    Exam: ENT dressing in place, no facial edema  No facial palsy, No visual deficit  No pronator drift  No focal motor deficit    CBC Full  -  ( 27 Oct 2017 14:05 )  WBC Count : 5.1 K/uL  Hemoglobin : 14.1 g/dL  Hematocrit : 39.2 %  Platelet Count - Automated : 164 K/uL  Mean Cell Volume : 91.8 fL  Mean Cell Hemoglobin : 33.0 pg  Mean Cell Hemoglobin Concentration : 36.0 g/dL  Auto Neutrophil # : x  Auto Lymphocyte # : x  Auto Monocyte # : x  Auto Eosinophil # : x  Auto Basophil # : x  Auto Neutrophil % : 82.5 %  Auto Lymphocyte % : 16.1 %  Auto Monocyte % : 1.0 %  Auto Eosinophil % : 0.2 %  Auto Basophil % : 0.2 %    10-27    132<L>  |  96  |  14  ----------------------------<  162<H>  4.2   |  23  |  1.03    Ca    9.0      27 Oct 2017 14:05  Phos  3.3     10-27  Mg     1.6     10-27    TPro  7.2  /  Alb  4.5  /  TBili  1.0  /  DBili  x   /  AST  25  /  ALT  23  /  AlkPhos  44  10-27    Wound: dressing is dry and clean    Imaging: CT scan tonight

## 2017-10-28 NOTE — PHYSICAL THERAPY INITIAL EVALUATION ADULT - MODALITIES TREATMENT COMMENTS
Facial Asymmetry: mild left facial droop, frontalis, buccinator intact, tongue in midline. Vision: H test intact, DDK: intact

## 2017-10-28 NOTE — OCCUPATIONAL THERAPY INITIAL EVALUATION ADULT - PERTINENT HX OF CURRENT PROBLEM, REHAB EVAL
52 year old male with PMH of HTN, Anxiety, ADHD with severe migraine headaches since April 2017 with outpatient work-up revealing a left sided acoustic mass likely neuroma. Patient also reports progressive hearing loss on left side. Patient's family at bedside report some asymmetry of his face and stuttering speech. S/p left retrosigmoid craniotomy approach for resection of acoustic neuroma on 10/27/17.

## 2017-10-28 NOTE — OCCUPATIONAL THERAPY INITIAL EVALUATION ADULT - GENERAL OBSERVATIONS, REHAB EVAL
Right hand dominant. Patient cleared for Occupational Therapy by Neurointensivist Dr. Brannon and covering MARNIE Grimaldo. Patient received supine in semi-corbin position in non-acute distress, family present at bedside. +EKG, +IV, +head bandage C/D/I, + bilateral sequential compression devices. Patient denies pain, reports mild HA after session however better when seated in chair, MARNIE Grimaldo aware.

## 2017-10-28 NOTE — PHYSICAL THERAPY INITIAL EVALUATION ADULT - PERTINENT HX OF CURRENT PROBLEM, REHAB EVAL
Patient is a 52 year old male outpatient work-up revealing a left sided acoustic mass likely neuroma. Patient also reports progressive hearing loss on left side.

## 2017-10-28 NOTE — OCCUPATIONAL THERAPY INITIAL EVALUATION ADULT - ADDITIONAL COMMENTS
Reports full independence with functional mobility and Activities of Daily Living without AD PTA, reports migraines and decreased left hearing PTA leading to surgical intervention. Reports full independence with functional mobility and Activities of Daily Living without AD PTA, reports migraines and decreased left hearing PTA leading to surgical intervention.    * At this time, patient reports left hearing to be at 40%.

## 2017-10-28 NOTE — PROGRESS NOTE ADULT - ASSESSMENT
Assessment/Plan: 52 male POD#0 left retrosigmoid craniotomy for resection of acoustic tumor, w intractable pain.   Plan: CT scan tonight  start Tramadol, Fioricet, reglan, Valproic Acid  decrease fentanyl  decadron   regular diet  Head of bed 45 degrees.  SICU care

## 2017-10-28 NOTE — OCCUPATIONAL THERAPY INITIAL EVALUATION ADULT - STANDING BALANCE: DYNAMIC, REHAB EVAL
ambulated 100 feet no AD, with supervision for EKG monitor and IV pole management, steady no LOB/good minus

## 2017-10-29 LAB
ANION GAP SERPL CALC-SCNC: 13 MMOL/L — SIGNIFICANT CHANGE UP (ref 5–17)
BUN SERPL-MCNC: 14 MG/DL — SIGNIFICANT CHANGE UP (ref 7–23)
CALCIUM SERPL-MCNC: 8.3 MG/DL — LOW (ref 8.4–10.5)
CHLORIDE SERPL-SCNC: 103 MMOL/L — SIGNIFICANT CHANGE UP (ref 96–108)
CO2 SERPL-SCNC: 24 MMOL/L — SIGNIFICANT CHANGE UP (ref 22–31)
CREAT SERPL-MCNC: 0.9 MG/DL — SIGNIFICANT CHANGE UP (ref 0.5–1.3)
GLUCOSE BLDC GLUCOMTR-MCNC: 121 MG/DL — HIGH (ref 70–99)
GLUCOSE BLDC GLUCOMTR-MCNC: 126 MG/DL — HIGH (ref 70–99)
GLUCOSE BLDC GLUCOMTR-MCNC: 164 MG/DL — HIGH (ref 70–99)
GLUCOSE BLDC GLUCOMTR-MCNC: 97 MG/DL — SIGNIFICANT CHANGE UP (ref 70–99)
GLUCOSE SERPL-MCNC: 130 MG/DL — HIGH (ref 70–99)
HCT VFR BLD CALC: 35.8 % — LOW (ref 39–50)
HGB BLD-MCNC: 12.7 G/DL — LOW (ref 13–17)
MAGNESIUM SERPL-MCNC: 2.1 MG/DL — SIGNIFICANT CHANGE UP (ref 1.6–2.6)
MCHC RBC-ENTMCNC: 33 PG — SIGNIFICANT CHANGE UP (ref 27–34)
MCHC RBC-ENTMCNC: 35.5 G/DL — SIGNIFICANT CHANGE UP (ref 32–36)
MCV RBC AUTO: 93 FL — SIGNIFICANT CHANGE UP (ref 80–100)
PHOSPHATE SERPL-MCNC: 2.5 MG/DL — SIGNIFICANT CHANGE UP (ref 2.5–4.5)
PLATELET # BLD AUTO: 146 K/UL — LOW (ref 150–400)
POTASSIUM SERPL-MCNC: 4.1 MMOL/L — SIGNIFICANT CHANGE UP (ref 3.5–5.3)
POTASSIUM SERPL-SCNC: 4.1 MMOL/L — SIGNIFICANT CHANGE UP (ref 3.5–5.3)
RBC # BLD: 3.85 M/UL — LOW (ref 4.2–5.8)
RBC # FLD: 12.4 % — SIGNIFICANT CHANGE UP (ref 10.3–16.9)
SODIUM SERPL-SCNC: 140 MMOL/L — SIGNIFICANT CHANGE UP (ref 135–145)
WBC # BLD: 9.7 K/UL — SIGNIFICANT CHANGE UP (ref 3.8–10.5)
WBC # FLD AUTO: 9.7 K/UL — SIGNIFICANT CHANGE UP (ref 3.8–10.5)

## 2017-10-29 PROCEDURE — 99231 SBSQ HOSP IP/OBS SF/LOW 25: CPT

## 2017-10-29 RX ORDER — ACETAMINOPHEN 500 MG
1000 TABLET ORAL ONCE
Qty: 0 | Refills: 0 | Status: COMPLETED | OUTPATIENT
Start: 2017-10-29 | End: 2017-10-29

## 2017-10-29 RX ORDER — OXYCODONE AND ACETAMINOPHEN 5; 325 MG/1; MG/1
1 TABLET ORAL EVERY 6 HOURS
Qty: 0 | Refills: 0 | Status: DISCONTINUED | OUTPATIENT
Start: 2017-10-29 | End: 2017-10-31

## 2017-10-29 RX ORDER — PANTOPRAZOLE SODIUM 20 MG/1
40 TABLET, DELAYED RELEASE ORAL
Qty: 0 | Refills: 0 | Status: DISCONTINUED | OUTPATIENT
Start: 2017-10-30 | End: 2017-10-31

## 2017-10-29 RX ORDER — NICOTINE POLACRILEX 2 MG
1 GUM BUCCAL DAILY
Qty: 0 | Refills: 0 | Status: DISCONTINUED | OUTPATIENT
Start: 2017-10-29 | End: 2017-10-31

## 2017-10-29 RX ADMIN — Medication 400 MILLIGRAM(S): at 13:28

## 2017-10-29 RX ADMIN — Medication 100 MILLIGRAM(S): at 13:09

## 2017-10-29 RX ADMIN — Medication 100 MILLIGRAM(S): at 05:14

## 2017-10-29 RX ADMIN — Medication 1 PATCH: at 11:37

## 2017-10-29 RX ADMIN — Medication 1000 MILLIGRAM(S): at 00:53

## 2017-10-29 RX ADMIN — Medication 4 MILLIGRAM(S): at 05:14

## 2017-10-29 RX ADMIN — Medication 4 MILLIGRAM(S): at 18:19

## 2017-10-29 RX ADMIN — SENNA PLUS 2 TABLET(S): 8.6 TABLET ORAL at 22:18

## 2017-10-29 RX ADMIN — Medication 100 MILLIGRAM(S): at 22:01

## 2017-10-29 RX ADMIN — ENOXAPARIN SODIUM 40 MILLIGRAM(S): 100 INJECTION SUBCUTANEOUS at 22:01

## 2017-10-29 RX ADMIN — Medication 1 MILLIGRAM(S): at 18:16

## 2017-10-29 RX ADMIN — ZOLPIDEM TARTRATE 5 MILLIGRAM(S): 10 TABLET ORAL at 22:12

## 2017-10-29 RX ADMIN — Medication 400 MILLIGRAM(S): at 22:01

## 2017-10-29 RX ADMIN — Medication 1000 MILLIGRAM(S): at 22:18

## 2017-10-29 RX ADMIN — Medication 4 MILLIGRAM(S): at 11:37

## 2017-10-29 RX ADMIN — Medication 400 MILLIGRAM(S): at 08:11

## 2017-10-29 RX ADMIN — Medication 1 MILLIGRAM(S): at 05:15

## 2017-10-29 RX ADMIN — Medication 400 MILLIGRAM(S): at 00:17

## 2017-10-29 RX ADMIN — Medication 1000 MILLIGRAM(S): at 09:23

## 2017-10-29 RX ADMIN — Medication 2: at 22:42

## 2017-10-29 RX ADMIN — Medication 1000 MILLIGRAM(S): at 14:07

## 2017-10-29 NOTE — PROGRESS NOTE ADULT - ASSESSMENT
Assessment/Plan: 52 male POD#2 left retrosigmoid craniotomy for resection of acoustic tumor, w intractable pain.   Plan: CT scan tonight  stop Tramadol  decadron per nsg  regular diet  OOBTC  transfer to stepdown

## 2017-10-29 NOTE — DIETITIAN INITIAL EVALUATION ADULT. - OTHER INFO
51yo POD#1 left retrosigmoid craniotomy for resection of acoustic tumor, w intractable pain. Currently on regular diet and tolerating PO. Endorses good appetite, consuming 50-75% meals. Denies GI distress at present. Pt inquired about losing wt. Noted pt is 103% of IBW. discussed increased nutrient needs at present 2/2 s/p surgery w/ surgical incision. Written edu on general healthy eating and outpatient RD referral card was provided. NKFA or dietary restrictions. Skin: surgical incision, GI WDL per flowsheet.

## 2017-10-29 NOTE — PROGRESS NOTE ADULT - SUBJECTIVE AND OBJECTIVE BOX
S/Overnight events:  No acute events overnight. Pt denies headache, acute changes in vision, acute weakness of extremities.    Hospital Course:   POD# 0: s/p crani for resection of left acoustic neuroma.  Overnight in ICU, no acute events  POD# 1: neuro stable, hearing on left side slightly improved  POD # 2: no acute events overnight, neuro stable    Vital Signs Last 24 Hrs  T(C): 36 (29 Oct 2017 00:35), Max: 37.3 (28 Oct 2017 18:11)  T(F): 96.8 (29 Oct 2017 00:35), Max: 99.1 (28 Oct 2017 18:11)  HR: 64 (29 Oct 2017 07:00) (64 - 106)  BP: 127/74 (29 Oct 2017 07:00) (115/60 - 157/86)  BP(mean): 94 (29 Oct 2017 07:00) (77 - 111)  RR: 14 (29 Oct 2017 07:00) (9 - 27)  SpO2: 95% (29 Oct 2017 07:00) (94% - 98%)    I&O's Detail    28 Oct 2017 07:01  -  29 Oct 2017 07:00  --------------------------------------------------------  IN:    IV PiggyBack: 150 mL    Oral Fluid: 700 mL    sodium chloride 0.9%: 200 mL  Total IN: 1050 mL    OUT:    Voided: 3850 mL  Total OUT: 3850 mL    Total NET: -2800 mL    I&O's Summary    28 Oct 2017 07:01  -  29 Oct 2017 07:00  --------------------------------------------------------  IN: 1050 mL / OUT: 3850 mL / NET: -2800 mL    PHYSICAL EXAM:  Neurological: AAOx3, FC, speech coherent  CNII-XII: EOM intact, PERRL, mild left facial (baseline) and decreased left hearing loss (baseline)  Motor: MAEx4 5/5 UE and LE B/L, neg protonator drift         [x] warm well perfused; capillary refill <3 seconds   Sensation: [x] intact to light touch  [] decreased:   Incision/Wound: Incision site C/D/I, dressing in place    TUBES/LINES:  [] CVC  [] A-line  [] Lumbar Drain  [] Ventriculostomy  [] Other    DIET:  [] NPO  [x] Mechanical  [] Tube feeds    LABS:                        12.7   9.7   )-----------( 146      ( 29 Oct 2017 06:13 )             35.8     10-29    140  |  103  |  14  ----------------------------<  130<H>  4.1   |  24  |  0.90    Ca    8.3<L>      29 Oct 2017 06:13  Phos  2.5     10-29  Mg     2.1     10-29    TPro  7.2  /  Alb  4.5  /  TBili  1.0  /  DBili  x   /  AST  25  /  ALT  23  /  AlkPhos  44  10-27      CAPILLARY BLOOD GLUCOSE  121 (29 Oct 2017 06:00)  151 (28 Oct 2017 22:00)    POCT Blood Glucose.: 121 mg/dL (29 Oct 2017 05:39)  POCT Blood Glucose.: 151 mg/dL (28 Oct 2017 21:44)  POCT Blood Glucose.: 142 mg/dL (28 Oct 2017 15:54)  POCT Blood Glucose.: 166 mg/dL (28 Oct 2017 12:08)      Drug Levels: [] N/A    CSF Analysis: [] N/A      Allergies    No Known Allergies    Intolerances      MEDICATIONS:  Antibiotics:    Neuro:  acetaminophen   Tablet 650 milliGRAM(s) Oral every 6 hours PRN  acetaminophen   Tablet. 650 milliGRAM(s) Oral every 6 hours PRN  ALPRAZolam 1 milliGRAM(s) Oral two times a day  buPROPion XL . 150 milliGRAM(s) Oral daily  ondansetron Injectable 4 milliGRAM(s) IV Push every 6 hours PRN  traMADol 25 milliGRAM(s) Oral every 8 hours PRN  zolpidem 5 milliGRAM(s) Oral at bedtime PRN    Anticoagulation:  enoxaparin Injectable 40 milliGRAM(s) SubCutaneous at bedtime    OTHER:  dexamethasone     Tablet 4 milliGRAM(s) Oral every 6 hours  dextrose 50% Injectable 12.5 Gram(s) IV Push once  dextrose 50% Injectable 25 Gram(s) IV Push once  dextrose 50% Injectable 25 Gram(s) IV Push once  dextrose Gel 1 Dose(s) Oral once PRN  docusate sodium 100 milliGRAM(s) Oral three times a day  glucagon  Injectable 1 milliGRAM(s) IntraMuscular once PRN  insulin lispro (HumaLOG) corrective regimen sliding scale   SubCutaneous Before meals and at bedtime  metoprolol     tartrate 100 milliGRAM(s) Oral daily  nicotine -  14 mG/24Hr(s) Patch 1 patch Transdermal daily  pantoprazole  Injectable 40 milliGRAM(s) IV Push daily  senna 2 Tablet(s) Oral at bedtime  Vyvanse cap 30 milliGRAM(s) 30 milliGRAM(s) Oral daily    IVF:  dextrose 5%. 1000 milliLiter(s) IV Continuous <Continuous>    CULTURES:    RADIOLOGY & ADDITIONAL TESTS:  [facility] 10/28/2017:  Status post left occipital craniotomy including partial   mastoidectomy. Foci of fluid and pneumocephalus underlying the craniotomy   site. Heterogeneous debris and few tiny foci of air seen in the region of   the left IAC probably related to postsurgical changes. No other foci of   hemorrhage seen. No midline shift or extra axial collections. No evidence   of acute transcortical infarction. No hydrocephalus. No evidence of a   fracture. Paranasal sinuses are clear. Small amount of fluid in the   remaining left mastoid air cells.    ASSESSMENT:  52y Male s/p left crani for acoustic neuroma resection, POD#2    CEREBELLOPONTINE ANGLE L  CEREBELLOPONTINE ANGLE LE  CEREBELLOPONTINE ANGLE LESION  No h/o HF  Unknown h/o HF  No pertinent family history in first degree relatives  Handoff  Anxiety disorder  ADHD (attention deficit hyperactivity disorder)  HTN (hypertension)  Acoustic neuroma  Acoustic nerve cancer, left  Acoustic nerve cancer of left ear  Anxiety disorder  ADHD (attention deficit hyperactivity disorder)  HTN (hypertension)  Acoustic neuroma  Craniotomy  No significant past surgical history      PLAN:  NEURO:  -neuro checks  -pain control: Wellbutrin tramadol  -continue decadron 4mg q6h  -remove head dressing tmrw per ENT recs    CARDIOVASCULAR:  --150  -continue metoprolol 100mg BID    PULMONARY:  -room air    RENAL:  -IVL    GI:  -docusate/senna  -PPI while on steroids    HEME:  -H/H stable, no issues    ID:  -no issues    ENDO:  -ISS    DVT PROPHYLAXIS:  [x] Venodynes                                [x] Heparin/Lovenox    DISPOSITION: Pending Home w/home PT vs no needs    -D/w , , and SICU team S/Overnight events:  No acute events overnight. Pt denies headache, acute changes in vision, acute weakness of extremities.    Hospital Course:   POD# 0: s/p crani for resection of left acoustic neuroma.  Overnight in ICU, no acute events  POD# 1: neuro stable, hearing on left side slightly improved  POD # 2: no acute events overnight, neuro stable    Vital Signs Last 24 Hrs  T(C): 36 (29 Oct 2017 00:35), Max: 37.3 (28 Oct 2017 18:11)  T(F): 96.8 (29 Oct 2017 00:35), Max: 99.1 (28 Oct 2017 18:11)  HR: 64 (29 Oct 2017 07:00) (64 - 106)  BP: 127/74 (29 Oct 2017 07:00) (115/60 - 157/86)  BP(mean): 94 (29 Oct 2017 07:00) (77 - 111)  RR: 14 (29 Oct 2017 07:00) (9 - 27)  SpO2: 95% (29 Oct 2017 07:00) (94% - 98%)    I&O's Detail    28 Oct 2017 07:01  -  29 Oct 2017 07:00  --------------------------------------------------------  IN:    IV PiggyBack: 150 mL    Oral Fluid: 700 mL    sodium chloride 0.9%: 200 mL  Total IN: 1050 mL    OUT:    Voided: 3850 mL  Total OUT: 3850 mL    Total NET: -2800 mL    I&O's Summary    28 Oct 2017 07:01  -  29 Oct 2017 07:00  --------------------------------------------------------  IN: 1050 mL / OUT: 3850 mL / NET: -2800 mL    PHYSICAL EXAM:  Neurological: AAOx3, FC, speech coherent  CNII-XII: EOM intact, PERRL, mild left facial (baseline) and decreased left hearing loss (baseline)  Motor: MAEx4 5/5 UE and LE B/L, neg protonator drift         [x] warm well perfused; capillary refill <3 seconds   Sensation: [x] intact to light touch  [] decreased:   Incision/Wound: Incision site C/D/I, dressing in place    TUBES/LINES:  [] CVC  [] A-line  [] Lumbar Drain  [] Ventriculostomy  [] Other    DIET:  [] NPO  [x] Mechanical  [] Tube feeds    LABS:                        12.7   9.7   )-----------( 146      ( 29 Oct 2017 06:13 )             35.8     10-29    140  |  103  |  14  ----------------------------<  130<H>  4.1   |  24  |  0.90    Ca    8.3<L>      29 Oct 2017 06:13  Phos  2.5     10-29  Mg     2.1     10-29    TPro  7.2  /  Alb  4.5  /  TBili  1.0  /  DBili  x   /  AST  25  /  ALT  23  /  AlkPhos  44  10-27      CAPILLARY BLOOD GLUCOSE  121 (29 Oct 2017 06:00)  151 (28 Oct 2017 22:00)    POCT Blood Glucose.: 121 mg/dL (29 Oct 2017 05:39)  POCT Blood Glucose.: 151 mg/dL (28 Oct 2017 21:44)  POCT Blood Glucose.: 142 mg/dL (28 Oct 2017 15:54)  POCT Blood Glucose.: 166 mg/dL (28 Oct 2017 12:08)      Drug Levels: [] N/A    CSF Analysis: [] N/A      Allergies    No Known Allergies    Intolerances      MEDICATIONS:  Antibiotics:    Neuro:  acetaminophen   Tablet 650 milliGRAM(s) Oral every 6 hours PRN  acetaminophen   Tablet. 650 milliGRAM(s) Oral every 6 hours PRN  ALPRAZolam 1 milliGRAM(s) Oral two times a day  buPROPion XL . 150 milliGRAM(s) Oral daily  ondansetron Injectable 4 milliGRAM(s) IV Push every 6 hours PRN  traMADol 25 milliGRAM(s) Oral every 8 hours PRN  zolpidem 5 milliGRAM(s) Oral at bedtime PRN    Anticoagulation:  enoxaparin Injectable 40 milliGRAM(s) SubCutaneous at bedtime    OTHER:  dexamethasone     Tablet 4 milliGRAM(s) Oral every 6 hours  dextrose 50% Injectable 12.5 Gram(s) IV Push once  dextrose 50% Injectable 25 Gram(s) IV Push once  dextrose 50% Injectable 25 Gram(s) IV Push once  dextrose Gel 1 Dose(s) Oral once PRN  docusate sodium 100 milliGRAM(s) Oral three times a day  glucagon  Injectable 1 milliGRAM(s) IntraMuscular once PRN  insulin lispro (HumaLOG) corrective regimen sliding scale   SubCutaneous Before meals and at bedtime  metoprolol     tartrate 100 milliGRAM(s) Oral daily  nicotine -  14 mG/24Hr(s) Patch 1 patch Transdermal daily  pantoprazole  Injectable 40 milliGRAM(s) IV Push daily  senna 2 Tablet(s) Oral at bedtime  Vyvanse cap 30 milliGRAM(s) 30 milliGRAM(s) Oral daily    IVF:  dextrose 5%. 1000 milliLiter(s) IV Continuous <Continuous>    CULTURES:    RADIOLOGY & ADDITIONAL TESTS:  [facility] 10/28/2017:  Status post left occipital craniotomy including partial   mastoidectomy. Foci of fluid and pneumocephalus underlying the craniotomy   site. Heterogeneous debris and few tiny foci of air seen in the region of   the left IAC probably related to postsurgical changes. No other foci of   hemorrhage seen. No midline shift or extra axial collections. No evidence   of acute transcortical infarction. No hydrocephalus. No evidence of a   fracture. Paranasal sinuses are clear. Small amount of fluid in the   remaining left mastoid air cells.    ASSESSMENT:  52y Male s/p left crani for acoustic neuroma resection, POD#2    CEREBELLOPONTINE ANGLE L  CEREBELLOPONTINE ANGLE LE  CEREBELLOPONTINE ANGLE LESION  No h/o HF  Unknown h/o HF  No pertinent family history in first degree relatives  Handoff  Anxiety disorder  ADHD (attention deficit hyperactivity disorder)  HTN (hypertension)  Acoustic neuroma  Acoustic nerve cancer, left  Acoustic nerve cancer of left ear  Anxiety disorder  ADHD (attention deficit hyperactivity disorder)  HTN (hypertension)  Acoustic neuroma  Craniotomy  No significant past surgical history      PLAN:  NEURO:  -neuro checks  -pain control: Wellbutrin tramadol  -continue decadron 4mg q6h  -remove mastoid dressing tmrw per ENT recs    CARDIOVASCULAR:  --150  -continue metoprolol 100mg BID    PULMONARY:  -room air    RENAL:  -IVL    GI:  -docusate/senna  -PPI while on steroids    HEME:  -H/H stable, no issues    ID:  -no issues    ENDO:  -ISS    DVT PROPHYLAXIS:  [x] Venodynes                                [x] Heparin/Lovenox    DISPOSITION: Pending Home w/home PT vs no needs    -D/w , , and SICU team

## 2017-10-29 NOTE — PROGRESS NOTE ADULT - SUBJECTIVE AND OBJECTIVE BOX
================================  NEUROCRITICAL CARE ATTENDING NOTE  =================================    KAROL CHAMBERS   MRN-[id number]  Summary:  52y/M  HPI:  52 year old male with PMH of HTN, Anxiety, ADHD with severe migraine headaches since April 2017 with outpatient work-up revealing a left sided acoustic mass likely neuroma. Patient also reports progressive hearing loss on left side. Patient's family at bedside report some asymmetry of his face and stuttering speech. Patient denies any recent headaches however, and denies any visual changes, extremity weakness or numbness, urinary/bowel changes, or gait disturbance. Denies any chest pain, SOB, seizures, syncope or other constitutional symptoms. MRI Brain from April, July and October with patient on disc. History of ASA and Motrin use, not used in the past week. (27 Oct 2017 07:39)    Overnight Events:  Denies HA/N/V/Dizziness.  Pain much better controlled.  Not asking for narcotics.     PHYSICAL EXAMINATION  T(C): 36.4 (10-28 @ 13:52), Max: 36.4 (10-28 @ 13:52)  HR: 106 (10-28 @ 17:00) (58 - 106)  BP: 138/86 (10-28 @ 17:00) (115/60 - 140/75)  RR: 25 (10-28 @ 17:00) (8 - 26)  SpO2: 95% (10-28 @ 17:00) (95% - 100%)       NEURO       MS:   A&Ox 4       CN:  L face palsy, chronic       MOTOR:  5/5 x 4       COORD: wnl  LABS:  CAPILLARY BLOOD GLUCOSE  145 (28 Oct 2017 07:00)  138 (27 Oct 2017 23:00)      POCT Blood Glucose.: 142 mg/dL (28 Oct 2017 15:54)  POCT Blood Glucose.: 166 mg/dL (28 Oct 2017 12:08)  POCT Blood Glucose.: 145 mg/dL (28 Oct 2017 06:31)  POCT Blood Glucose.: 138 mg/dL (27 Oct 2017 22:37)                          13.5   9.5   )-----------( 153      ( 28 Oct 2017 04:24 )             36.7     10-28    133<L>  |  99  |  12  ----------------------------<  146<H>  4.0   |  20<L>  |  0.86    Ca    8.5      28 Oct 2017 04:24  Phos  2.1     10-28  Mg     2.2     10-28    TPro  7.2  /  Alb  4.5  /  TBili  1.0  /  DBili  x   /  AST  25  /  ALT  23  /  AlkPhos  44  10-27      10-27 @ 07:01  -  10-28 @ 07:00  --------------------------------------------------------  IN: 1950 mL / OUT: 880 mL / NET: 1070 mL    10-28 @ 07:01  -  10-28 @ 17:50  --------------------------------------------------------  IN: 850 mL / OUT: 2300 mL / NET: -1450 mL    MEDICATIONS:  MEDICATIONS  (STANDING):  ALPRAZolam 1 milliGRAM(s) Oral two times a day  buPROPion XL . 150 milliGRAM(s) Oral daily  dexamethasone     Tablet 4 milliGRAM(s) Oral every 6 hours  docusate sodium 100 milliGRAM(s) Oral three times a day  enoxaparin Injectable 40 milliGRAM(s) SubCutaneous at bedtime  insulin lispro (HumaLOG) corrective regimen sliding scale   SubCutaneous Before meals and at bedtime  nicotine -  14 mG/24Hr(s) Patch 1 patch Transdermal daily  pantoprazole  Injectable 40 milliGRAM(s) IV Push daily  senna 2 Tablet(s) Oral at bedtime  Vyvanse cap 30 milliGRAM(s) 30 milliGRAM(s) Oral daily    MEDICATIONS  (PRN):  acetaminophen   Tablet 650 milliGRAM(s) Oral every 6 hours PRN For Temp greater than 38.5 C (101.3 F)  acetaminophen   Tablet. 650 milliGRAM(s) Oral every 6 hours PRN Moderate Pain (4 - 6)  dextrose Gel 1 Dose(s) Oral once PRN Blood Glucose LESS THAN 70 milliGRAM(s)/deciliter  glucagon  Injectable 1 milliGRAM(s) IntraMuscular once PRN Glucose LESS THAN 70 milligrams/deciliter  ondansetron Injectable 4 milliGRAM(s) IV Push every 6 hours PRN Nausea and/or Vomiting  traMADol 25 milliGRAM(s) Oral every 8 hours PRN Severe Pain (7 - 10)  zolpidem 5 milliGRAM(s) Oral at bedtime PRN Insomnia

## 2017-10-29 NOTE — DIETITIAN INITIAL EVALUATION ADULT. - ENERGY NEEDS
Height: 5'11" Weight: 177lbs, IBW 172lbs+/-10%, %%, BMI 24.8  ABW used for calculations as pt between % of IBW.   Needs adjusted 2/2 surgical incision

## 2017-10-29 NOTE — PROGRESS NOTE ADULT - SUBJECTIVE AND OBJECTIVE BOX
ENT [facility] DAILY PROGRESS NOTE    Overnight events/Interval HPI: 52y Male POD2 s/p left retrosigmoid approach for resection of acoustic neuroma. No acute events overnight, doing well.       Allergies    No Known Allergies    Intolerances        MEDICATIONS:  Antiinfectives:     IV fluids:  dextrose 5%. 1000 milliLiter(s) IV Continuous <Continuous>    Hematologic/Anticoagulation:  enoxaparin Injectable 40 milliGRAM(s) SubCutaneous at bedtime    Pain medications/Neuro:  acetaminophen   Tablet 650 milliGRAM(s) Oral every 6 hours PRN  acetaminophen   Tablet. 650 milliGRAM(s) Oral every 6 hours PRN  ALPRAZolam 1 milliGRAM(s) Oral two times a day  buPROPion XL . 150 milliGRAM(s) Oral daily  ondansetron Injectable 4 milliGRAM(s) IV Push every 6 hours PRN  zolpidem 5 milliGRAM(s) Oral at bedtime PRN    Endocrine Medications:   dexamethasone     Tablet 4 milliGRAM(s) Oral every 6 hours  dextrose 50% Injectable 12.5 Gram(s) IV Push once  dextrose 50% Injectable 25 Gram(s) IV Push once  dextrose 50% Injectable 25 Gram(s) IV Push once  dextrose Gel 1 Dose(s) Oral once PRN  glucagon  Injectable 1 milliGRAM(s) IntraMuscular once PRN  insulin lispro (HumaLOG) corrective regimen sliding scale   SubCutaneous Before meals and at bedtime    All other standing medications:   docusate sodium 100 milliGRAM(s) Oral three times a day  metoprolol     tartrate 100 milliGRAM(s) Oral daily  nicotine -   7 mG/24Hr(s) Patch 1 patch Transdermal daily  senna 2 Tablet(s) Oral at bedtime  Vyvanse cap 30 milliGRAM(s) 30 milliGRAM(s) Oral daily    All other PRN medications:      Vital Signs Last 24 Hrs  T(C): 36.5 (29 Oct 2017 14:09), Max: 37.3 (28 Oct 2017 18:11)  T(F): 97.7 (29 Oct 2017 14:09), Max: 99.1 (28 Oct 2017 18:11)  HR: 86 (29 Oct 2017 14:00) (64 - 106)  BP: 144/83 (29 Oct 2017 14:00) (117/85 - 163/92)  BP(mean): 107 (29 Oct 2017 14:00) (84 - 128)  RR: 21 (29 Oct 2017 14:00) (9 - 27)  SpO2: 94% (29 Oct 2017 14:00) (94% - 98%)      10-28 @ 07:01  -  10-29 @ 07:00  --------------------------------------------------------  IN:    IV PiggyBack: 150 mL    Oral Fluid: 700 mL    sodium chloride 0.9%: 200 mL  Total IN: 1050 mL    OUT:    Voided: 3850 mL  Total OUT: 3850 mL    Total NET: -2800 mL      10-29 @ 07:01  -  10-29 @ 14:24  --------------------------------------------------------  IN:    Oral Fluid: 400 mL  Total IN: 400 mL    OUT:    Voided: 750 mL  Total OUT: 750 mL    Total NET: -350 mL            PHYSICAL EXAM:  NAD, AAOx3   Breathing comfortably on room air   Wound soft, flat, mastoid dressing replaced   Face HB I/VI    LABS:  CBC-                        12.7   9.7   )-----------( 146      ( 29 Oct 2017 06:13 )             35.8     BMP/CMP-  29 Oct 2017 06:13    140    |  103    |  14     ----------------------------<  130    4.1     |  24     |  0.90     Ca    8.3        29 Oct 2017 06:13  Phos  2.5       29 Oct 2017 06:13  Mg     2.1       29 Oct 2017 06:13      Coagulation Studies-    Endocrine Panel-  Calcium, Total Serum: 8.3 mg/dL (10-29 @ 06:13)      Assessment/Plan:  52y Male s/p left retrosigmoid approach to resection of acoustic neuroma  - Will remove mastoid dressing tomorrow  - Moving to step down today   - ENT will follow

## 2017-10-30 ENCOUNTER — TRANSCRIPTION ENCOUNTER (OUTPATIENT)
Age: 52
End: 2017-10-30

## 2017-10-30 LAB
ANION GAP SERPL CALC-SCNC: 14 MMOL/L — SIGNIFICANT CHANGE UP (ref 5–17)
BUN SERPL-MCNC: 12 MG/DL — SIGNIFICANT CHANGE UP (ref 7–23)
CALCIUM SERPL-MCNC: 8.7 MG/DL — SIGNIFICANT CHANGE UP (ref 8.4–10.5)
CHLORIDE SERPL-SCNC: 102 MMOL/L — SIGNIFICANT CHANGE UP (ref 96–108)
CO2 SERPL-SCNC: 24 MMOL/L — SIGNIFICANT CHANGE UP (ref 22–31)
CREAT SERPL-MCNC: 0.79 MG/DL — SIGNIFICANT CHANGE UP (ref 0.5–1.3)
GLUCOSE BLDC GLUCOMTR-MCNC: 111 MG/DL — HIGH (ref 70–99)
GLUCOSE BLDC GLUCOMTR-MCNC: 113 MG/DL — HIGH (ref 70–99)
GLUCOSE BLDC GLUCOMTR-MCNC: 135 MG/DL — HIGH (ref 70–99)
GLUCOSE BLDC GLUCOMTR-MCNC: 148 MG/DL — HIGH (ref 70–99)
GLUCOSE SERPL-MCNC: 134 MG/DL — HIGH (ref 70–99)
HCT VFR BLD CALC: 36.7 % — LOW (ref 39–50)
HGB BLD-MCNC: 12.8 G/DL — LOW (ref 13–17)
MAGNESIUM SERPL-MCNC: 2.3 MG/DL — SIGNIFICANT CHANGE UP (ref 1.6–2.6)
MCHC RBC-ENTMCNC: 32.7 PG — SIGNIFICANT CHANGE UP (ref 27–34)
MCHC RBC-ENTMCNC: 34.9 G/DL — SIGNIFICANT CHANGE UP (ref 32–36)
MCV RBC AUTO: 93.9 FL — SIGNIFICANT CHANGE UP (ref 80–100)
PHOSPHATE SERPL-MCNC: 2.1 MG/DL — LOW (ref 2.5–4.5)
PLATELET # BLD AUTO: 147 K/UL — LOW (ref 150–400)
POTASSIUM SERPL-MCNC: 4.2 MMOL/L — SIGNIFICANT CHANGE UP (ref 3.5–5.3)
POTASSIUM SERPL-SCNC: 4.2 MMOL/L — SIGNIFICANT CHANGE UP (ref 3.5–5.3)
RBC # BLD: 3.91 M/UL — LOW (ref 4.2–5.8)
RBC # FLD: 12.3 % — SIGNIFICANT CHANGE UP (ref 10.3–16.9)
SODIUM SERPL-SCNC: 140 MMOL/L — SIGNIFICANT CHANGE UP (ref 135–145)
WBC # BLD: 7.9 K/UL — SIGNIFICANT CHANGE UP (ref 3.8–10.5)
WBC # FLD AUTO: 7.9 K/UL — SIGNIFICANT CHANGE UP (ref 3.8–10.5)

## 2017-10-30 PROCEDURE — 99233 SBSQ HOSP IP/OBS HIGH 50: CPT | Mod: GC

## 2017-10-30 PROCEDURE — 99233 SBSQ HOSP IP/OBS HIGH 50: CPT

## 2017-10-30 RX ORDER — DEXAMETHASONE 0.5 MG/5ML
4 ELIXIR ORAL EVERY 12 HOURS
Qty: 0 | Refills: 0 | Status: DISCONTINUED | OUTPATIENT
Start: 2017-10-30 | End: 2017-10-31

## 2017-10-30 RX ORDER — MAGNESIUM HYDROXIDE 400 MG/1
30 TABLET, CHEWABLE ORAL DAILY
Qty: 0 | Refills: 0 | Status: DISCONTINUED | OUTPATIENT
Start: 2017-10-30 | End: 2017-10-31

## 2017-10-30 RX ORDER — OXYCODONE HYDROCHLORIDE 5 MG/1
5 TABLET ORAL ONCE
Qty: 0 | Refills: 0 | Status: DISCONTINUED | OUTPATIENT
Start: 2017-10-30 | End: 2017-10-30

## 2017-10-30 RX ORDER — ZOLPIDEM TARTRATE 10 MG/1
5 TABLET ORAL ONCE
Qty: 0 | Refills: 0 | Status: DISCONTINUED | OUTPATIENT
Start: 2017-10-30 | End: 2017-10-30

## 2017-10-30 RX ADMIN — ENOXAPARIN SODIUM 40 MILLIGRAM(S): 100 INJECTION SUBCUTANEOUS at 21:17

## 2017-10-30 RX ADMIN — OXYCODONE HYDROCHLORIDE 5 MILLIGRAM(S): 5 TABLET ORAL at 16:00

## 2017-10-30 RX ADMIN — PANTOPRAZOLE SODIUM 40 MILLIGRAM(S): 20 TABLET, DELAYED RELEASE ORAL at 06:48

## 2017-10-30 RX ADMIN — OXYCODONE AND ACETAMINOPHEN 1 TABLET(S): 5; 325 TABLET ORAL at 11:20

## 2017-10-30 RX ADMIN — Medication 100 MILLIGRAM(S): at 06:48

## 2017-10-30 RX ADMIN — SENNA PLUS 2 TABLET(S): 8.6 TABLET ORAL at 21:17

## 2017-10-30 RX ADMIN — OXYCODONE HYDROCHLORIDE 5 MILLIGRAM(S): 5 TABLET ORAL at 15:09

## 2017-10-30 RX ADMIN — Medication 1 PATCH: at 11:04

## 2017-10-30 RX ADMIN — Medication 1 MILLIGRAM(S): at 06:48

## 2017-10-30 RX ADMIN — Medication 100 MILLIGRAM(S): at 21:17

## 2017-10-30 RX ADMIN — ZOLPIDEM TARTRATE 5 MILLIGRAM(S): 10 TABLET ORAL at 23:38

## 2017-10-30 RX ADMIN — Medication 100 MILLIGRAM(S): at 13:43

## 2017-10-30 RX ADMIN — OXYCODONE AND ACETAMINOPHEN 1 TABLET(S): 5; 325 TABLET ORAL at 12:15

## 2017-10-30 RX ADMIN — Medication 4 MILLIGRAM(S): at 17:43

## 2017-10-30 RX ADMIN — Medication 4 MILLIGRAM(S): at 00:11

## 2017-10-30 RX ADMIN — Medication 4 MILLIGRAM(S): at 06:48

## 2017-10-30 NOTE — PROGRESS NOTE ADULT - SUBJECTIVE AND OBJECTIVE BOX
HPI:  52 year old male with PMH of HTN, Anxiety, ADHD with severe migraine headaches since April 2017 with outpatient work-up revealing a left sided acoustic mass likely neuroma. Patient also reports progressive hearing loss on left side. Patient's family at bedside report some asymmetry of his face and stuttering speech. Patient denies any recent headaches however, and denies any visual changes, extremity weakness or numbness, urinary/bowel changes, or gait disturbance. Denies any chest pain, SOB, seizures, syncope or other constitutional symptoms. MRI Brain from April, July and October with patient on disc. History of ASA and Motrin use, not used in the past week. (27 Oct 2017 07:39)    OVERNIGHT EVENTS: No significant events overnight    Hospital Course:   POD# 0: s/p crani for resection of left acoustic neuroma.  Overnight in ICU, no acute events  POD# 1: neuro stable, hearing on left side slightly improved  POD # 2: no acute events overnight, neuro stable  POD#3: Head dressing removed. Continue slow decadron taper. PT/OT for final discharge plan. Neurologically stable.    Vital Signs Last 24 Hrs  T(C): 36.2 (30 Oct 2017 05:17), Max: 36.7 (29 Oct 2017 22:28)  T(F): 97.2 (30 Oct 2017 05:17), Max: 98.1 (29 Oct 2017 22:28)  HR: 60 (30 Oct 2017 04:33) (60 - 94)  BP: 140/88 (30 Oct 2017 04:33) (117/85 - 163/92)  BP(mean): 109 (30 Oct 2017 04:33) (91 - 128)  RR: 17 (30 Oct 2017 04:33) (12 - 21)  SpO2: 96% (30 Oct 2017 04:33) (94% - 99%)    I&O's Summary    28 Oct 2017 07:01  -  29 Oct 2017 07:00  --------------------------------------------------------  IN: 1050 mL / OUT: 3850 mL / NET: -2800 mL    29 Oct 2017 07:01  -  30 Oct 2017 06:07  --------------------------------------------------------  IN: 400 mL / OUT: 3575 mL / NET: -3175 mL        PHYSICAL EXAM:  Gen: NAD, AAOx3  HEENT: PERRL. EOMI. +Head dressing C/D/I.  Neck: FROM, nontender  Lungs: Clear b/l  Heart: S1, S2. NSR.  Abd: Soft, NT/ND. +BS  Exts: Pulses 2+ throughout  Neuro: Mild left facial asymmetry, and decreased hearing on left, CNs II-XII otherwise intact. 5/5 str x4 extremities. Sensation to LT intact. Following commands.    TUBES/LINES:  [] Pressley  [] Lumbar Drain  [] Wound Drains  [] Others      DIET:  [] NPO  [x] Mechanical  [] Tube feeds    LABS:                        12.7   9.7   )-----------( 146      ( 29 Oct 2017 06:13 )             35.8     10-29    140  |  103  |  14  ----------------------------<  130<H>  4.1   |  24  |  0.90    Ca    8.3<L>      29 Oct 2017 06:13  Phos  2.5     10-29  Mg     2.1     10-29              CAPILLARY BLOOD GLUCOSE      POCT Blood Glucose.: 148 mg/dL (30 Oct 2017 05:11)  POCT Blood Glucose.: 164 mg/dL (29 Oct 2017 21:46)  POCT Blood Glucose.: 97 mg/dL (29 Oct 2017 17:28)  POCT Blood Glucose.: 126 mg/dL (29 Oct 2017 11:34)      Drug Levels: [] N/A    CSF Analysis: [] N/A      Allergies    No Known Allergies    Intolerances      MEDICATIONS:  Antibiotics:    Neuro:  acetaminophen   Tablet 650 milliGRAM(s) Oral every 6 hours PRN  acetaminophen   Tablet. 650 milliGRAM(s) Oral every 6 hours PRN  ALPRAZolam 1 milliGRAM(s) Oral two times a day  buPROPion XL . 150 milliGRAM(s) Oral daily  ondansetron Injectable 4 milliGRAM(s) IV Push every 6 hours PRN  oxyCODONE    5 mG/acetaminophen 325 mG 1 Tablet(s) Oral every 6 hours PRN  zolpidem 5 milliGRAM(s) Oral at bedtime PRN    Anticoagulation:  enoxaparin Injectable 40 milliGRAM(s) SubCutaneous at bedtime    OTHER:  dexamethasone     Tablet 4 milliGRAM(s) Oral every 6 hours  dextrose 50% Injectable 12.5 Gram(s) IV Push once  dextrose 50% Injectable 25 Gram(s) IV Push once  dextrose 50% Injectable 25 Gram(s) IV Push once  dextrose Gel 1 Dose(s) Oral once PRN  docusate sodium 100 milliGRAM(s) Oral three times a day  glucagon  Injectable 1 milliGRAM(s) IntraMuscular once PRN  insulin lispro (HumaLOG) corrective regimen sliding scale   SubCutaneous Before meals and at bedtime  metoprolol     tartrate 100 milliGRAM(s) Oral daily  nicotine -   7 mG/24Hr(s) Patch 1 patch Transdermal daily  pantoprazole    Tablet 40 milliGRAM(s) Oral before breakfast  senna 2 Tablet(s) Oral at bedtime  Vyvanse cap 30 milliGRAM(s) 30 milliGRAM(s) Oral daily    IVF:  dextrose 5%. 1000 milliLiter(s) IV Continuous <Continuous>    CULTURES:    RADIOLOGY & ADDITIONAL TESTS:      ASSESSMENT:  52y Male w/ ADHD, Anxiety, HTN s/p left craniotomy for acoustic neuroma resection POD#3      PLAN:  NEURO: Continue Decadron, slow taper  Neuro checks q4h,  Pain meds PRN,  Continue home medications for ADHD,  Ambien PRN sleep,  Plan to DC head dressing today    CARDIOVASCULAR: Normotensive BP goals,  Will f/u AM labs, electrolyte repletion PRN    PULMONARY: Incentive spirometry, nicotine patch    RENAL: Voiding    GI: PO diet, PPI, stool softeners    HEME: SCDs, SQL    ID: Afebrile, no antibiotics    ENDO: ISS during steroid use    DISPOSITION: PT/OT following - home PT vs no needs, will f/u,  Discharge planning,  Will d/w Dr. Simms HPI:  52 year old male with PMH of HTN, Anxiety, ADHD with severe migraine headaches since April 2017 with outpatient work-up revealing a left sided acoustic mass likely neuroma. Patient also reports progressive hearing loss on left side. Patient's family at bedside report some asymmetry of his face and stuttering speech. Patient denies any recent headaches however, and denies any visual changes, extremity weakness or numbness, urinary/bowel changes, or gait disturbance. Denies any chest pain, SOB, seizures, syncope or other constitutional symptoms. MRI Brain from April, July and October with patient on disc. History of ASA and Motrin use, not used in the past week. (27 Oct 2017 07:39)    OVERNIGHT EVENTS: No significant events overnight.     Hospital Course:   POD# 0: s/p crani for resection of left acoustic neuroma.  Overnight in ICU, no acute events  POD# 1: neuro stable, hearing on left side slightly improved  POD # 2: no acute events overnight, neuro stable  POD#3: Head dressing removed. Continue slow decadron taper. PT/OT for final discharge plan. Neurologically stable.    Vital Signs Last 24 Hrs  T(C): 36.2 (30 Oct 2017 05:17), Max: 36.7 (29 Oct 2017 22:28)  T(F): 97.2 (30 Oct 2017 05:17), Max: 98.1 (29 Oct 2017 22:28)  HR: 60 (30 Oct 2017 04:33) (60 - 94)  BP: 140/88 (30 Oct 2017 04:33) (117/85 - 163/92)  BP(mean): 109 (30 Oct 2017 04:33) (91 - 128)  RR: 17 (30 Oct 2017 04:33) (12 - 21)  SpO2: 96% (30 Oct 2017 04:33) (94% - 99%)    I&O's Summary    28 Oct 2017 07:01  -  29 Oct 2017 07:00  --------------------------------------------------------  IN: 1050 mL / OUT: 3850 mL / NET: -2800 mL    29 Oct 2017 07:01  -  30 Oct 2017 06:07  --------------------------------------------------------  IN: 400 mL / OUT: 3575 mL / NET: -3175 mL        PHYSICAL EXAM:  Gen: Anxious, AAOx3  HEENT: PERRL. EOMI. +Head dressing C/D/I.  Neck: FROM, nontender  Lungs: Clear b/l  Heart: S1, S2. NSR.  Abd: Soft, NT/ND. +BS  Exts: Pulses 2+ throughout  Neuro: Mild left facial asymmetry, and decreased hearing on left, CNs II-XII otherwise intact. 5/5 str x4 extremities. Sensation to LT intact. Following commands.    TUBES/LINES:  [] Pressley  [] Lumbar Drain  [] Wound Drains  [] Others      DIET:  [] NPO  [x] Mechanical  [] Tube feeds    LABS:                        12.7   9.7   )-----------( 146      ( 29 Oct 2017 06:13 )             35.8     10-29    140  |  103  |  14  ----------------------------<  130<H>  4.1   |  24  |  0.90    Ca    8.3<L>      29 Oct 2017 06:13  Phos  2.5     10-29  Mg     2.1     10-29              CAPILLARY BLOOD GLUCOSE      POCT Blood Glucose.: 148 mg/dL (30 Oct 2017 05:11)  POCT Blood Glucose.: 164 mg/dL (29 Oct 2017 21:46)  POCT Blood Glucose.: 97 mg/dL (29 Oct 2017 17:28)  POCT Blood Glucose.: 126 mg/dL (29 Oct 2017 11:34)      Drug Levels: [] N/A    CSF Analysis: [] N/A      Allergies    No Known Allergies    Intolerances      MEDICATIONS:  Antibiotics:    Neuro:  acetaminophen   Tablet 650 milliGRAM(s) Oral every 6 hours PRN  acetaminophen   Tablet. 650 milliGRAM(s) Oral every 6 hours PRN  ALPRAZolam 1 milliGRAM(s) Oral two times a day  buPROPion XL . 150 milliGRAM(s) Oral daily  ondansetron Injectable 4 milliGRAM(s) IV Push every 6 hours PRN  oxyCODONE    5 mG/acetaminophen 325 mG 1 Tablet(s) Oral every 6 hours PRN  zolpidem 5 milliGRAM(s) Oral at bedtime PRN    Anticoagulation:  enoxaparin Injectable 40 milliGRAM(s) SubCutaneous at bedtime    OTHER:  dexamethasone     Tablet 4 milliGRAM(s) Oral every 6 hours  dextrose 50% Injectable 12.5 Gram(s) IV Push once  dextrose 50% Injectable 25 Gram(s) IV Push once  dextrose 50% Injectable 25 Gram(s) IV Push once  dextrose Gel 1 Dose(s) Oral once PRN  docusate sodium 100 milliGRAM(s) Oral three times a day  glucagon  Injectable 1 milliGRAM(s) IntraMuscular once PRN  insulin lispro (HumaLOG) corrective regimen sliding scale   SubCutaneous Before meals and at bedtime  metoprolol     tartrate 100 milliGRAM(s) Oral daily  nicotine -   7 mG/24Hr(s) Patch 1 patch Transdermal daily  pantoprazole    Tablet 40 milliGRAM(s) Oral before breakfast  senna 2 Tablet(s) Oral at bedtime  Vyvanse cap 30 milliGRAM(s) 30 milliGRAM(s) Oral daily    IVF:  dextrose 5%. 1000 milliLiter(s) IV Continuous <Continuous>    CULTURES:    RADIOLOGY & ADDITIONAL TESTS:      ASSESSMENT:  52y Male w/ ADHD, Anxiety, HTN s/p left craniotomy for acoustic neuroma resection POD#3      PLAN:  NEURO: Continue Decadron, slow taper  Neuro checks q4h,  Pain meds PRN,  Continue home medications for ADHD,  Ambien PRN sleep,  Plan to DC head dressing today    CARDIOVASCULAR: Normotensive BP goals,  Will f/u AM labs, electrolyte repletion PRN    PULMONARY: Incentive spirometry, nicotine patch    RENAL: Voiding    GI: PO diet, PPI, stool softeners    HEME: SCDs, SQL    ID: Afebrile, no antibiotics    ENDO: ISS during steroid use    DISPOSITION: PT/OT following - home PT vs no needs, will f/u,  S/W for discharge planning and home needs,	  Will d/w Dr. Simms

## 2017-10-30 NOTE — DISCHARGE NOTE ADULT - MEDICATION SUMMARY - MEDICATIONS TO TAKE
I will START or STAY ON the medications listed below when I get home from the hospital:    dexamethasone 2 mg oral tablet  -- 1 tab(s) by mouth 2 times a day MDD:2  -- Indication: For Acoustic neuroma    Percocet 5/325 oral tablet  -- 1 tab(s) by mouth every 6 hours MDD:4  -- Indication: For Acoustic neuroma    aspirin 81 mg oral tablet  -- 1 tab(s) by mouth once a day  -- Indication: For HTN (hypertension)    acetaminophen 325 mg oral tablet  -- 2 tab(s) by mouth every 6 hours, As needed, For Temp greater than 38.5 C (101.3 F)  -- Indication: For Acoustic neuroma    acetaminophen 325 mg oral tablet  -- 2 tab(s) by mouth every 6 hours, As needed, Moderate Pain (4 - 6)  -- Indication: For Acoustic neuroma    Xanax 1 mg oral tablet  -- 1 tab(s) by mouth 2 times a day  -- Indication: For Anxiety disorder    Metoprolol Tartrate 50 mg oral tablet  -- 1 tab(s) by mouth 2 times a day  -- Indication: For HTN (hypertension)    Vyvanse 30 mg oral capsule  -- 1 cap(s) by mouth once a day (in the morning)  -- Indication: For ADHD (attention deficit hyperactivity disorder)    docusate sodium 100 mg oral capsule  -- 1 cap(s) by mouth 3 times a day  -- Indication: For Acoustic neuroma    senna oral tablet  -- 2 tab(s) by mouth once a day (at bedtime)  -- Indication: For Acoustic neuroma    pantoprazole 40 mg oral delayed release tablet  -- 1 tab(s) by mouth once a day (before a meal)  -- Indication: For Acoustic neuroma    nicotine 7 mg/24 hr transdermal film, extended release  -- 7 milligram(s) by transdermal patch once a day  -- Indication: For Acoustic neuroma    buPROPion 150 mg/24 hours (XL) oral tablet, extended release  -- 1 tab(s) by mouth every 24 hours  -- Indication: For Anxiety disorder

## 2017-10-30 NOTE — PROGRESS NOTE ADULT - ASSESSMENT
52M with L acoustic neuroma s/p resection (10/27/2017, Dr. Simms, Dr. Nova), Hypertension, ADHD, anxiety    PLAN:   NEURO: neurochecks q4h, PRN pain meds  s/p resection acoustic neuroma:  steroid taper as per neurosurgery  cont bupropion, d/c ambien, continue home zolpidem  REHAB:  physical therapy evaluation and management    EARLY MOB:  ambulate    PULM:  Room air, incentive spirometry  CARDIO:  SBP goal 100-150mm Hg  ENDO:  Blood sugar goals 140-180 mg/dL, continue insulin sliding scale  GI:  PPI for GI prophylaxis while on steroids  DIET:  regular diet  RENAL:  IVL  HEM/ONC: Hb stable  VTE Prophylaxis: SCDs, SQL  ID: afebrile, no leukocytosis  Social: will update family    Patient not at high risk for neurologic deterioration / death.  Time spent on this noncritically ill patient: 45 minutes. 52M with L acoustic neuroma s/p resection (10/27/2017, Dr. Simms, Dr. Nova), Hypertension, ADHD, anxiety    PLAN:   NEURO: neurochecks q4h, PRN pain meds  s/p resection acoustic neuroma:  steroid taper as per neurosurgery  cont bupropion, d/c ambien, continue home zolpidem  ADHD: continue lisdexamfetamine  REHAB:  physical therapy evaluation and management    EARLY MOB:  ambulate    PULM:  Room air, incentive spirometry  CARDIO:  SBP goal 100-150mm Hg  ENDO:  Blood sugar goals 140-180 mg/dL, continue insulin sliding scale  GI:  PPI for GI prophylaxis while on steroids  DIET:  regular diet  RENAL:  IVL  HEM/ONC: Hb stable  VTE Prophylaxis: SCDs, SQL  ID: afebrile, no leukocytosis  Social: will update family    Patient not at high risk for neurologic deterioration / death.  Time spent on this noncritically ill patient: 45 minutes.

## 2017-10-30 NOTE — DISCHARGE NOTE ADULT - PATIENT PORTAL LINK FT
“You can access the [employer] Patient Portal, offered by [facility], by registering with the following website: [email or web address]”

## 2017-10-30 NOTE — DISCHARGE NOTE ADULT - HOSPITAL COURSE
HPI: 52 year old male with PMH of HTN, Anxiety, ADHD with severe migraine headaches since April 2017 with outpatient work-up revealing a left sided acoustic mass likely neuroma. Patient also reports progressive hearing loss on left side. Patient's family at bedside report some asymmetry of his face and stuttering speech. Patient denies any recent headaches however, and denies any visual changes, extremity weakness or numbness, urinary/bowel changes, or gait disturbance. Denies any chest pain, SOB, seizures, syncope or other constitutional symptoms. MRI Brain from April, July and October with patient on disc. History of ASA and Motrin use, not used in the past week,    Patient underwent left craniotomy for acoustic neuroma resection with Dr. Pizano from ENT. No intraoperative complications. Admitted to SICU pot-op with CT Head performed POD#0 with post operative changes as expected. Remained clinically stable throughout admission on Decadron. Evaluated by PT and OT, cleared to go home. Tolerated PO diet, OOB, ambulation. +voiding, +BM. Pain well controlled. Continued home ADHD and Anxiety medications.  on case for home needs and discharge planning. Head dressing removed POD#3. HPI: 52 year old male with PMH of HTN, Anxiety, ADHD with severe migraine headaches since April 2017 with outpatient work-up revealing a left sided acoustic mass likely neuroma. Patient also reports progressive hearing loss on left side. Patient's family at bedside report some asymmetry of his face and stuttering speech. Patient denies any recent headaches however, and denies any visual changes, extremity weakness or numbness, urinary/bowel changes, or gait disturbance. Denies any chest pain, SOB, seizures, syncope or other constitutional symptoms. MRI Brain from April, July and October with patient on disc. History of ASA and Motrin use, not used in the past week,    Patient underwent left craniotomy for acoustic neuroma resection with Dr. Pizano from ENT. No intraoperative complications. Admitted to SICU pot-op with CT Head performed POD#0 with post operative changes as expected. Remained clinically stable throughout admission on Decadron. Evaluated by PT and OT, cleared to go home. Tolerated PO diet, OOB, ambulation. +voiding, +BM. Pain well controlled. Continued home ADHD and Anxiety medications.  on case for home needs and discharge planning. Head dressing removed POD#3.  Pt doing well and discharged home with services

## 2017-10-30 NOTE — PROGRESS NOTE ADULT - SUBJECTIVE AND OBJECTIVE BOX
Interval Events: reviewed  Patient seen and examined at bedside.    Patient is a 52y old  Male who presents with a chief complaint of Migraine headaches and hearing loss (30 Oct 2017 08:53)    he is concerned about the heart   PAST MEDICAL & SURGICAL HISTORY:  Anxiety disorder  ADHD (attention deficit hyperactivity disorder)  HTN (hypertension)  Acoustic neuroma  No significant past surgical history      MEDICATIONS:  Pulmonary:    Antimicrobials:    Anticoagulants:  enoxaparin Injectable 40 milliGRAM(s) SubCutaneous at bedtime    Cardiac:  metoprolol     tartrate 100 milliGRAM(s) Oral daily      Allergies    No Known Allergies    Intolerances        Vital Signs Last 24 Hrs  T(C): 36.4 (30 Oct 2017 09:00), Max: 36.7 (29 Oct 2017 22:28)  T(F): 97.5 (30 Oct 2017 09:00), Max: 98.1 (29 Oct 2017 22:28)  HR: 64 (30 Oct 2017 09:00) (60 - 94)  BP: 148/86 (30 Oct 2017 09:00) (137/60 - 163/92)  BP(mean): 110 (30 Oct 2017 09:00) (92 - 128)  RR: 16 (30 Oct 2017 09:00) (14 - 21)  SpO2: 97% (30 Oct 2017 09:00) (94% - 99%)    10-29 @ 07:01  -  10-30 @ 07:00  --------------------------------------------------------  IN: 400 mL / OUT: 3875 mL / NET: -3475 mL    10-30 @ 07:01  -  10-30 @ 11:28  --------------------------------------------------------  IN: 0 mL / OUT: 250 mL / NET: -250 mL          LABS:      CBC Full  -  ( 30 Oct 2017 07:56 )  WBC Count : 7.9 K/uL  Hemoglobin : 12.8 g/dL  Hematocrit : 36.7 %  Platelet Count - Automated : 147 K/uL  Mean Cell Volume : 93.9 fL  Mean Cell Hemoglobin : 32.7 pg  Mean Cell Hemoglobin Concentration : 34.9 g/dL  Auto Neutrophil # : x  Auto Lymphocyte # : x  Auto Monocyte # : x  Auto Eosinophil # : x  Auto Basophil # : x  Auto Neutrophil % : x  Auto Lymphocyte % : x  Auto Monocyte % : x  Auto Eosinophil % : x  Auto Basophil % : x    10-30    140  |  102  |  12  ----------------------------<  134<H>  4.2   |  24  |  0.79    Ca    8.7      30 Oct 2017 07:56  Phos  2.1     10-30  Mg     2.3     10-30                          RADIOLOGY & ADDITIONAL STUDIES (The following images were personally reviewed):  Pressley:                          No  Urine output:               Yes          DVT prophylaxis:         Yes          Flattus:                          Yes         Bowel movement:               No

## 2017-10-30 NOTE — PROGRESS NOTE ADULT - PROBLEM SELECTOR PLAN 1
the patient is stable the heart rate is controlled but had episodes of tachycardia.  The BP is on upper level of normal an d could be related to pain or anxiety.   I will observe for the next 24 hour prior any adjustment.

## 2017-10-30 NOTE — DISCHARGE NOTE ADULT - CARE PROVIDER_API CALL
Kaushik Simms), Neurological Surgery  [address]  [address]  [place] [address]  Phone: [phone]  Fax: [phone]

## 2017-10-30 NOTE — PROGRESS NOTE ADULT - SUBJECTIVE AND OBJECTIVE BOX
=================================  NEUROCRITICAL CARE ATTENDING NOTE  =================================    KAROL CHAMBERS   MRN-[id number]  Summary:  52y/M with Hypertension, anxiety, ADHD with severe migraine headaches, progressive hearing loss L side, asymmetry of face, stuttering speech  MRI showed L sided acoustic neuroma.  Admitted 10/27.  underwent resection by Dr. Simms / [facility].  Overnight Events:  No significant events overnight  REVIEW OF SYSTEMS:  No headaches, no nausea or vomiting; 14 -point review of systems otherwise unremarkable.    PAST MEDICAL & SURGICAL HISTORY: anxiety ADHD Hypertension acoustic neuroma   NKDA  Home meds: metoprolol 50mg BID Vyvanse (lisdexamfetamine) 30mg daily in AM asa 81mg daily xanax 1mg BID bupropion 150mg XL daily     PHYSICAL EXAMINATION  T(C): 36.4 (10-30 @ 09:00), Max: 36.7 (10-29 @ 22:28) HR: 64 (10-30 @ 09:00) (60 - 94) BP: 148/86 (10-30 @ 09:00) (137/60 - 150/83) RR: 16 (10-30 @ 09:00) (14 - 21) SpO2: 97% (10-30 @ 09:00) (94% - 99%)  NEUROLOGIC EXAMINATION:  Patient is awake, alert, fully oriented, pupils 2-3mm equal and briskly reactive to light, EOMs intact, muscle strength 5/5 on all 4 extremities  GENERAL:  not intubated, not in cardiorespiratory distress  EENT: anicteric  CARDIOVASC:  (+) S1 S2, normal rate and regular rhythm  PULMONARY:  clear to auscultation bilaterally  ABDOMEN:  soft, nontender, with normoactive bowel sounds  EXTREMITIES:  no edema  SKIN:  no rash    LABS:  CAPILLARY BLOOD GLUCOSE 135  148  164  97               12.8   7.9   )-----------( 147      ( 30 Oct 2017 07:56 )             36.7     140  |  102  |  12  ----------------------------<  134<H>  4.2   |  24  |  0.79    Ca    8.7      30 Oct 2017 07:56  Phos  2.1     10-30  Mg     2.3     10-30    10-29 @ 07:01  -  10-30 @ 07:00  IN: 400 mL / OUT: 3875 mL / NET: -3475 mL    Bacteriology:  CSF studies:  EEG:  Neuroimaging: 10/28 CT head - postsurgical changes  Other imaging:    MEDICATIONS: alprazolam 1mg BID bupropion XL 150mg daily dexamethasone 4mg PO q12h docusate SQL 40 HS mod ISS lisdexamfetamine 30mg daily metoprolol 100mg daily nicotine 7mg TD patch daily pantoprazole 40 PO daily senna percocet zolpidem    IV FLUIDS: IVL  DRIPS:  DIET: regular diet  Lines:  Drains:      CODE STATUS:  full code                       GOALS OF CARE:  aggressive                      DISPOSITION:  8La

## 2017-10-30 NOTE — PROGRESS NOTE ADULT - SUBJECTIVE AND OBJECTIVE BOX
ENT [facility] DAILY PROGRESS NOTE    Overnight events/Interval HPI: 52y Male POD3 s/p left retrosigmoid approach for resection of acoustic neuroma. No acute events overnight, doing well.       Allergies    No Known Allergies    Intolerances        MEDICATIONS:  Antiinfectives:     IV fluids:  dextrose 5%. 1000 milliLiter(s) IV Continuous <Continuous>    Hematologic/Anticoagulation:  enoxaparin Injectable 40 milliGRAM(s) SubCutaneous at bedtime    Pain medications/Neuro:  acetaminophen   Tablet 650 milliGRAM(s) Oral every 6 hours PRN  acetaminophen   Tablet. 650 milliGRAM(s) Oral every 6 hours PRN  ALPRAZolam 1 milliGRAM(s) Oral two times a day  buPROPion XL . 150 milliGRAM(s) Oral daily  ondansetron Injectable 4 milliGRAM(s) IV Push every 6 hours PRN  oxyCODONE    5 mG/acetaminophen 325 mG 1 Tablet(s) Oral every 6 hours PRN    Endocrine Medications:   dexamethasone     Tablet 4 milliGRAM(s) Oral every 12 hours  dextrose 50% Injectable 12.5 Gram(s) IV Push once  dextrose 50% Injectable 25 Gram(s) IV Push once  dextrose 50% Injectable 25 Gram(s) IV Push once  dextrose Gel 1 Dose(s) Oral once PRN  glucagon  Injectable 1 milliGRAM(s) IntraMuscular once PRN  insulin lispro (HumaLOG) corrective regimen sliding scale   SubCutaneous Before meals and at bedtime    All other standing medications:   docusate sodium 100 milliGRAM(s) Oral three times a day  Lisdexamfetamine (VYVANSE) 30 milliGRAM(s) 30 milliGRAM(s) Oral daily  metoprolol     tartrate 100 milliGRAM(s) Oral daily  nicotine -   7 mG/24Hr(s) Patch 1 patch Transdermal daily  pantoprazole    Tablet 40 milliGRAM(s) Oral before breakfast  senna 2 Tablet(s) Oral at bedtime    All other PRN medications:      Vital Signs Last 24 Hrs  T(C): 36.4 (30 Oct 2017 09:00), Max: 36.7 (29 Oct 2017 22:28)  T(F): 97.5 (30 Oct 2017 09:00), Max: 98.1 (29 Oct 2017 22:28)  HR: 70 (30 Oct 2017 13:41) (60 - 82)  BP: 142/83 (30 Oct 2017 13:41) (137/60 - 150/83)  BP(mean): 106 (30 Oct 2017 13:41) (92 - 110)  RR: 18 (30 Oct 2017 13:41) (14 - 18)  SpO2: 95% (30 Oct 2017 13:41) (94% - 99%)      10-29 @ 07:01  -  10-30 @ 07:00  --------------------------------------------------------  IN:    Oral Fluid: 400 mL  Total IN: 400 mL    OUT:    Voided: 3875 mL  Total OUT: 3875 mL    Total NET: -3475 mL      10-30 @ 07:01  -  10-30 @ 14:20  --------------------------------------------------------  IN:  Total IN: 0 mL    OUT:    Voided: 250 mL  Total OUT: 250 mL    Total NET: -250 mL            PHYSICAL EXAM:    NAD, AAOx3   Breathing comfortably on room air   Wound soft, flat, mastoid dressing removed, incision c/d/i  Face HB I/VI    LABS:  CBC-                        12.8   7.9   )-----------( 147      ( 30 Oct 2017 07:56 )             36.7     BMP/CMP-  30 Oct 2017 07:56    140    |  102    |  12     ----------------------------<  134    4.2     |  24     |  0.79     Ca    8.7        30 Oct 2017 07:56  Phos  2.1       30 Oct 2017 07:56  Mg     2.3       30 Oct 2017 07:56      Coagulation Studies-    Endocrine Panel-  Calcium, Total Serum: 8.7 mg/dL (10-30 @ 07:56)      Assessment/Plan:  52y Male s/p left retrosigmoid approach to resection of acoustic neuroma  - Working with PT, discharge plans per neurosurgery  - ENT will follow

## 2017-10-30 NOTE — DISCHARGE NOTE ADULT - PLAN OF CARE
Continue home Xanax Continue home medications Follow-up primary care doctor upon discharge s/p craniotomy Discharge home, cleared by PT/OT,  Steroid taper,  follow-up Dr. Simms as outpatient for wound check,  follow-up Dr. Pizano ENT post-op,  Pain medications as needed, stool softeners over the counter,  Return to ED for seizures, syncope, new neurological symptoms, wound discharge or redness, fever

## 2017-10-31 VITALS
DIASTOLIC BLOOD PRESSURE: 82 MMHG | HEART RATE: 62 BPM | OXYGEN SATURATION: 97 % | SYSTOLIC BLOOD PRESSURE: 135 MMHG | RESPIRATION RATE: 17 BRPM | TEMPERATURE: 97 F

## 2017-10-31 LAB
GLUCOSE BLDC GLUCOMTR-MCNC: 106 MG/DL — HIGH (ref 70–99)
GLUCOSE BLDC GLUCOMTR-MCNC: 117 MG/DL — HIGH (ref 70–99)
SURGICAL PATHOLOGY STUDY: SIGNIFICANT CHANGE UP

## 2017-10-31 PROCEDURE — 99233 SBSQ HOSP IP/OBS HIGH 50: CPT | Mod: GC

## 2017-10-31 PROCEDURE — 86901 BLOOD TYPING SEROLOGIC RH(D): CPT

## 2017-10-31 PROCEDURE — 99233 SBSQ HOSP IP/OBS HIGH 50: CPT | Mod: 24

## 2017-10-31 PROCEDURE — C1889: CPT

## 2017-10-31 PROCEDURE — 80048 BASIC METABOLIC PNL TOTAL CA: CPT

## 2017-10-31 PROCEDURE — 97116 GAIT TRAINING THERAPY: CPT

## 2017-10-31 PROCEDURE — 86900 BLOOD TYPING SEROLOGIC ABO: CPT

## 2017-10-31 PROCEDURE — 85027 COMPLETE CBC AUTOMATED: CPT

## 2017-10-31 PROCEDURE — 80053 COMPREHEN METABOLIC PANEL: CPT

## 2017-10-31 PROCEDURE — 86850 RBC ANTIBODY SCREEN: CPT

## 2017-10-31 PROCEDURE — 70450 CT HEAD/BRAIN W/O DYE: CPT

## 2017-10-31 PROCEDURE — 88304 TISSUE EXAM BY PATHOLOGIST: CPT

## 2017-10-31 PROCEDURE — 36415 COLL VENOUS BLD VENIPUNCTURE: CPT

## 2017-10-31 PROCEDURE — 83735 ASSAY OF MAGNESIUM: CPT

## 2017-10-31 PROCEDURE — 84100 ASSAY OF PHOSPHORUS: CPT

## 2017-10-31 PROCEDURE — C1713: CPT

## 2017-10-31 PROCEDURE — 97161 PT EVAL LOW COMPLEX 20 MIN: CPT

## 2017-10-31 PROCEDURE — 82962 GLUCOSE BLOOD TEST: CPT

## 2017-10-31 PROCEDURE — 85025 COMPLETE CBC W/AUTO DIFF WBC: CPT

## 2017-10-31 RX ORDER — ACETAMINOPHEN 500 MG
2 TABLET ORAL
Qty: 0 | Refills: 0 | COMMUNITY
Start: 2017-10-31

## 2017-10-31 RX ORDER — AMLODIPINE BESYLATE 2.5 MG/1
5 TABLET ORAL DAILY
Qty: 0 | Refills: 0 | Status: DISCONTINUED | OUTPATIENT
Start: 2017-10-31 | End: 2017-10-31

## 2017-10-31 RX ORDER — DOCUSATE SODIUM 100 MG
1 CAPSULE ORAL
Qty: 0 | Refills: 0 | COMMUNITY
Start: 2017-10-31

## 2017-10-31 RX ORDER — NICOTINE POLACRILEX 2 MG
7 GUM BUCCAL
Qty: 0 | Refills: 0 | COMMUNITY
Start: 2017-10-31

## 2017-10-31 RX ORDER — PANTOPRAZOLE SODIUM 20 MG/1
1 TABLET, DELAYED RELEASE ORAL
Qty: 10 | Refills: 0 | OUTPATIENT
Start: 2017-10-31 | End: 2017-11-10

## 2017-10-31 RX ORDER — DEXAMETHASONE 0.5 MG/5ML
1 ELIXIR ORAL
Qty: 4 | Refills: 0 | OUTPATIENT
Start: 2017-10-31 | End: 2017-11-02

## 2017-10-31 RX ORDER — DEXAMETHASONE 0.5 MG/5ML
2 ELIXIR ORAL EVERY 8 HOURS
Qty: 0 | Refills: 0 | Status: DISCONTINUED | OUTPATIENT
Start: 2017-10-31 | End: 2017-10-31

## 2017-10-31 RX ORDER — SENNA PLUS 8.6 MG/1
2 TABLET ORAL
Qty: 0 | Refills: 0 | COMMUNITY
Start: 2017-10-31

## 2017-10-31 RX ADMIN — MAGNESIUM HYDROXIDE 30 MILLILITER(S): 400 TABLET, CHEWABLE ORAL at 06:46

## 2017-10-31 RX ADMIN — Medication 1 PATCH: at 15:16

## 2017-10-31 RX ADMIN — OXYCODONE AND ACETAMINOPHEN 1 TABLET(S): 5; 325 TABLET ORAL at 10:30

## 2017-10-31 RX ADMIN — OXYCODONE AND ACETAMINOPHEN 1 TABLET(S): 5; 325 TABLET ORAL at 09:33

## 2017-10-31 RX ADMIN — PANTOPRAZOLE SODIUM 40 MILLIGRAM(S): 20 TABLET, DELAYED RELEASE ORAL at 06:46

## 2017-10-31 RX ADMIN — Medication 100 MILLIGRAM(S): at 15:16

## 2017-10-31 RX ADMIN — Medication 4 MILLIGRAM(S): at 06:46

## 2017-10-31 RX ADMIN — Medication 100 MILLIGRAM(S): at 06:46

## 2017-10-31 NOTE — PROGRESS NOTE ADULT - ATTENDING COMMENTS
This note is written for the first day in ICU, 10/27/17.
increase activity no evidence of thromboembolic disease
increase activity no evidence of thromboembolic disease

## 2017-10-31 NOTE — PROGRESS NOTE ADULT - SUBJECTIVE AND OBJECTIVE BOX
HPI:  52 year old male with PMH of HTN, Anxiety, ADHD with severe migraine headaches since April 2017 with outpatient work-up revealing a left sided acoustic mass likely neuroma. Patient also reports progressive hearing loss on left side. Patient's family at bedside report some asymmetry of his face and stuttering speech. Patient denies any recent headaches however, and denies any visual changes, extremity weakness or numbness, urinary/bowel changes, or gait disturbance. Denies any chest pain, SOB, seizures, syncope or other constitutional symptoms. MRI Brain from April, July and October with patient on disc. History of ASA and Motrin use, not used in the past week. (27 Oct 2017 07:39)    OVERNIGHT EVENTS:  Vital Signs Last 24 Hrs  T(C): 36.8 (31 Oct 2017 05:34), Max: 36.8 (31 Oct 2017 05:34)  T(F): 98.2 (31 Oct 2017 05:34), Max: 98.2 (31 Oct 2017 05:34)  HR: 60 (31 Oct 2017 06:42) (56 - 86)  BP: 145/88 (31 Oct 2017 06:42) (140/80 - 161/93)  BP(mean): 111 (31 Oct 2017 06:42) (106 - 120)  RR: 18 (31 Oct 2017 06:42) (16 - 18)  SpO2: 96% (31 Oct 2017 06:42) (94% - 98%)    I&O's Summary    30 Oct 2017 07:01  -  31 Oct 2017 07:00  --------------------------------------------------------  IN: 0 mL / OUT: 1250 mL / NET: -1250 mL        PHYSICAL EXAM:  Neurological:A&OX3 Cranial nerves intact  NUNO  Crani incsion CDI     Cardiovascular: RRR  Respiratory: Lungs CTAB  Gastrointestinal: +BS  Genitourinary: Voiding without difficulty  Extremities: warm and dry    DIET: Regular  LABS:                        12.8   7.9   )-----------( 147      ( 30 Oct 2017 07:56 )             36.7     10-30    140  |  102  |  12  ----------------------------<  134<H>  4.2   |  24  |  0.79    Ca    8.7      30 Oct 2017 07:56  Phos  2.1     10-30  Mg     2.3     10-30              CAPILLARY BLOOD GLUCOSE      POCT Blood Glucose.: 106 mg/dL (31 Oct 2017 04:57)  POCT Blood Glucose.: 111 mg/dL (30 Oct 2017 22:00)  POCT Blood Glucose.: 113 mg/dL (30 Oct 2017 17:17)  POCT Blood Glucose.: 135 mg/dL (30 Oct 2017 11:00)      Drug Levels: [] N/A    CSF Analysis: [] N/A      Allergies    No Known Allergies    Intolerances      MEDICATIONS:  Antibiotics:    Neuro:  acetaminophen   Tablet 650 milliGRAM(s) Oral every 6 hours PRN  acetaminophen   Tablet. 650 milliGRAM(s) Oral every 6 hours PRN  ALPRAZolam 1 milliGRAM(s) Oral two times a day  buPROPion XL . 150 milliGRAM(s) Oral daily  ondansetron Injectable 4 milliGRAM(s) IV Push every 6 hours PRN  oxyCODONE    5 mG/acetaminophen 325 mG 1 Tablet(s) Oral every 6 hours PRN    Anticoagulation:  enoxaparin Injectable 40 milliGRAM(s) SubCutaneous at bedtime    OTHER:  dexamethasone     Tablet 2 milliGRAM(s) Oral every 8 hours  dextrose 50% Injectable 12.5 Gram(s) IV Push once  dextrose 50% Injectable 25 Gram(s) IV Push once  dextrose 50% Injectable 25 Gram(s) IV Push once  dextrose Gel 1 Dose(s) Oral once PRN  docusate sodium 100 milliGRAM(s) Oral three times a day  glucagon  Injectable 1 milliGRAM(s) IntraMuscular once PRN  insulin lispro (HumaLOG) corrective regimen sliding scale   SubCutaneous Before meals and at bedtime  Lisdexamfetamine (VYVANSE) 30 milliGRAM(s) 30 milliGRAM(s) Oral daily  magnesium hydroxide Suspension 30 milliLiter(s) Oral daily PRN  metoprolol     tartrate 100 milliGRAM(s) Oral daily  nicotine -   7 mG/24Hr(s) Patch 1 patch Transdermal daily  pantoprazole    Tablet 40 milliGRAM(s) Oral before breakfast  senna 2 Tablet(s) Oral at bedtime    IVF:  dextrose 5%. 1000 milliLiter(s) IV Continuous <Continuous>        ASSESSMENT:  62y Male w/ ADHD, Anxiety, HTN s/p left craniotomy for acoustic neuroma resection POD#4      PLAN:    NEURO:     Continue Decadron, slow taper  Neuro checks q4h,  Pain meds PRN,  Continue home medications for ADHD,  Ambien PRN sleep,  Continue current medical regime    Dispo: Discussed with attending

## 2017-10-31 NOTE — PROGRESS NOTE ADULT - SUBJECTIVE AND OBJECTIVE BOX
Interval Events: reviewed  Patient seen and examined at bedside.    Patient is a 52y old  Male who presents with a chief complaint of Migraine headaches and hearing loss (30 Oct 2017 08:53)    he is complainif pain and the Tylenol is not helping  PAST MEDICAL & SURGICAL HISTORY:  Anxiety disorder  ADHD (attention deficit hyperactivity disorder)  HTN (hypertension)  Acoustic neuroma  No significant past surgical history      MEDICATIONS:  Pulmonary:    Antimicrobials:    Anticoagulants:  enoxaparin Injectable 40 milliGRAM(s) SubCutaneous at bedtime    Cardiac:  amLODIPine   Tablet 5 milliGRAM(s) Oral daily  metoprolol     tartrate 100 milliGRAM(s) Oral daily      Allergies    No Known Allergies    Intolerances        Vital Signs Last 24 Hrs  T(C): 36.3 (31 Oct 2017 10:00), Max: 36.8 (31 Oct 2017 05:34)  T(F): 97.4 (31 Oct 2017 10:00), Max: 98.2 (31 Oct 2017 05:34)  HR: 60 (31 Oct 2017 09:04) (56 - 86)  BP: 155/88 (31 Oct 2017 09:04) (140/80 - 161/93)  BP(mean): 115 (31 Oct 2017 09:04) (106 - 120)  RR: 19 (31 Oct 2017 09:04) (16 - 19)  SpO2: 98% (31 Oct 2017 09:04) (94% - 98%)    10-30 @ 07:01  -  10-31 @ 07:00  --------------------------------------------------------  IN: 0 mL / OUT: 1250 mL / NET: -1250 mL    10-31 @ 07:01  -  10-31 @ 12:36  --------------------------------------------------------  IN: 0 mL / OUT: 400 mL / NET: -400 mL          LABS:      CBC Full  -  ( 30 Oct 2017 07:56 )  WBC Count : 7.9 K/uL  Hemoglobin : 12.8 g/dL  Hematocrit : 36.7 %  Platelet Count - Automated : 147 K/uL  Mean Cell Volume : 93.9 fL  Mean Cell Hemoglobin : 32.7 pg  Mean Cell Hemoglobin Concentration : 34.9 g/dL  Auto Neutrophil # : x  Auto Lymphocyte # : x  Auto Monocyte # : x  Auto Eosinophil # : x  Auto Basophil # : x  Auto Neutrophil % : x  Auto Lymphocyte % : x  Auto Monocyte % : x  Auto Eosinophil % : x  Auto Basophil % : x    10-30    140  |  102  |  12  ----------------------------<  134<H>  4.2   |  24  |  0.79    Ca    8.7      30 Oct 2017 07:56  Phos  2.1     10-30  Mg     2.3     10-30                          RADIOLOGY & ADDITIONAL STUDIES (The following images were personally reviewed):  Pressley:                                    No  Urine output:               Yes          DVT prophylaxis:         Yes          Flattus:                          Yes          Bowel movement:       Yes

## 2017-10-31 NOTE — PROGRESS NOTE ADULT - SUBJECTIVE AND OBJECTIVE BOX
ENT [facility] DAILY PROGRESS NOTE    Overnight events/Interval HPI: 52y Male POD4 s/p left retrosigmoid approach for resection of acoustic neuroma. No acute events overnight, doing well. Working with PT, feels ready to go home.         Allergies    No Known Allergies    Intolerances        MEDICATIONS:  Antiinfectives:     IV fluids:  dextrose 5%. 1000 milliLiter(s) IV Continuous <Continuous>    Hematologic/Anticoagulation:  enoxaparin Injectable 40 milliGRAM(s) SubCutaneous at bedtime    Pain medications/Neuro:  acetaminophen   Tablet 650 milliGRAM(s) Oral every 6 hours PRN  acetaminophen   Tablet. 650 milliGRAM(s) Oral every 6 hours PRN  ALPRAZolam 1 milliGRAM(s) Oral two times a day  buPROPion XL . 150 milliGRAM(s) Oral daily  ondansetron Injectable 4 milliGRAM(s) IV Push every 6 hours PRN  oxyCODONE    5 mG/acetaminophen 325 mG 1 Tablet(s) Oral every 6 hours PRN    Endocrine Medications:   dexamethasone     Tablet 2 milliGRAM(s) Oral every 8 hours  dextrose 50% Injectable 12.5 Gram(s) IV Push once  dextrose 50% Injectable 25 Gram(s) IV Push once  dextrose 50% Injectable 25 Gram(s) IV Push once  dextrose Gel 1 Dose(s) Oral once PRN  glucagon  Injectable 1 milliGRAM(s) IntraMuscular once PRN  insulin lispro (HumaLOG) corrective regimen sliding scale   SubCutaneous Before meals and at bedtime    All other standing medications:   docusate sodium 100 milliGRAM(s) Oral three times a day  Lisdexamfetamine (VYVANSE) 30 milliGRAM(s) 30 milliGRAM(s) Oral daily  metoprolol     tartrate 100 milliGRAM(s) Oral daily  nicotine -   7 mG/24Hr(s) Patch 1 patch Transdermal daily  pantoprazole    Tablet 40 milliGRAM(s) Oral before breakfast  senna 2 Tablet(s) Oral at bedtime    All other PRN medications:  magnesium hydroxide Suspension 30 milliLiter(s) Oral daily PRN      Vital Signs Last 24 Hrs  T(C): 36.8 (31 Oct 2017 05:34), Max: 36.8 (31 Oct 2017 05:34)  T(F): 98.2 (31 Oct 2017 05:34), Max: 98.2 (31 Oct 2017 05:34)  HR: 60 (31 Oct 2017 06:42) (56 - 86)  BP: 145/88 (31 Oct 2017 06:42) (140/80 - 161/93)  BP(mean): 111 (31 Oct 2017 06:42) (106 - 120)  RR: 18 (31 Oct 2017 06:42) (16 - 18)  SpO2: 96% (31 Oct 2017 06:42) (94% - 98%)      10-30 @ 07:01  -  10-31 @ 07:00  --------------------------------------------------------  IN:  Total IN: 0 mL    OUT:    Voided: 1250 mL  Total OUT: 1250 mL    Total NET: -1250 mL            PHYSICAL EXAM:  NAD, AAOx3   Breathing comfortably on room air   Wound soft, flat, mastoid dressing removed, incision c/d/i  Face HB I/VI    LABS:  CBC-                        12.8   7.9   )-----------( 147      ( 30 Oct 2017 07:56 )             36.7     BMP/CMP-  30 Oct 2017 07:56    140    |  102    |  12     ----------------------------<  134    4.2     |  24     |  0.79     Ca    8.7        30 Oct 2017 07:56  Phos  2.1       30 Oct 2017 07:56  Mg     2.3       30 Oct 2017 07:56    Assessment/Plan:  52y Male s/p left retrosigmoid approach to resection of acoustic neuroma  - Working with PT, discharge plans per neurosurgery  - Follow up with Dr. Pizano in 2 weeks

## 2017-10-31 NOTE — PROGRESS NOTE ADULT - PROVIDER SPECIALTY LIST ADULT
ENT
Internal Medicine
Internal Medicine
NSICU
Neurosurgery
ENT
ENT
Neurosurgery
NSICU

## 2017-10-31 NOTE — PROGRESS NOTE ADULT - SUBJECTIVE AND OBJECTIVE BOX
=================================  NEUROCRITICAL CARE ATTENDING NOTE  =================================    KAROL CHAMBERS   MRN-[id number]  Summary:  52y/M with Hypertension, anxiety, ADHD with severe migraine headaches, progressive hearing loss L side, asymmetry of face, stuttering speech  MRI showed L sided acoustic neuroma.  Admitted 10/27.  underwent resection by Dr. Simms / [facility].  Overnight Events:  No significant events overnight  REVIEW OF SYSTEMS:  No headaches, no nausea or vomiting; 14 -point review of systems otherwise unremarkable.    PAST MEDICAL & SURGICAL HISTORY: anxiety ADHD Hypertension acoustic neuroma   NKDA  Home meds: metoprolol 50mg BID Vyvanse (lisdexamfetamine) 30mg daily in AM asa 81mg daily xanax 1mg BID bupropion 150mg XL daily     PHYSICAL EXAMINATION  T(C): 36.3 (10-31 @ 10:00), Max: 36.8 (10-31 @ 05:34) HR: 60 (10-31 @ 09:04) (56 - 86) BP: 155/88 (10-31 @ 09:04) (140/80 - 161/93) RR: 19 (10-31 @ 09:04) (16 - 19) SpO2: 98% (10-31 @ 09:04) (94% - 98%)   NEUROLOGIC EXAMINATION:  Patient is awake, alert, fully oriented, pupils 2-3mm equal and briskly reactive to light, EOMs intact, muscle strength 5/5 on all 4 extremities  GENERAL:  not intubated, not in cardiorespiratory distress  EENT: anicteric  CARDIOVASC:  (+) S1 S2, normal rate and regular rhythm  PULMONARY:  clear to auscultation bilaterally  ABDOMEN:  soft, nontender, with normoactive bowel sounds  EXTREMITIES:  no edema  SKIN:  no rash    LABS:  CAPILLARY BLOOD GLUCOSE 106   111   113   135             No new labs    10-30 @ 07:01  -  10-31 @ 07:00  IN: 0 mL / OUT: 1250 mL / NET: -1250 mL      Bacteriology:  CSF studies:  EEG:  Neuroimaging: 10/28 CT head - postsurgical changes  Other imaging:    MEDICATIONS: alprazolam 1mg BID bupropion XL 150mg daily dexamethasone 2mg q8h SQL 40 HS docusate mod ISS lisdexamfetamine 30mg daily metoprolol 100mg daily nicotine percocet pantoprazole senna    IV FLUIDS: IVL  DRIPS:  DIET: regular diet  Lines:  Drains:      CODE STATUS:  full code                       GOALS OF CARE:  aggressive                      DISPOSITION:  8La / discharge planning

## 2017-10-31 NOTE — PROGRESS NOTE ADULT - ASSESSMENT
52M with L acoustic neuroma s/p resection (10/27/2017, Dr. Simms, Dr. Nova), Hypertension, ADHD, anxiety    PLAN:   NEURO: neurochecks q4h, PRN pain meds; no IV opiates  s/p resection acoustic neuroma:  steroid taper as per neurosurgery  cont bupropion, continue home zolpidem; off ambien  ADHD: continue lisdexamfetamine  REHAB:  physical therapy evaluation and management    EARLY MOB:  ambulate    PULM:  Room air, incentive spirometry  CARDIO:  SBP goal 100-150mm Hg  ENDO:  Blood sugar goals 140-180 mg/dL, continue insulin sliding scale  GI:  PPI for GI prophylaxis while on steroids  DIET:  regular diet  RENAL:  IVL  HEM/ONC: Hb stable  VTE Prophylaxis: SCDs, SQL  ID: afebrile, no leukocytosis  Social: will update family    Patient not at high risk for neurologic deterioration / death.  Time spent on this noncritically ill patient: 45 minutes.

## 2017-10-31 NOTE — PROGRESS NOTE ADULT - PROBLEM SELECTOR PLAN 1
the patient is stable the heart rate is controlled but had episodes of tachycardia.  The BP is on upper level of normal an d could be related to pain or anxiety.   I will observe for the next 24 hour prior any adjustment.  I started the patient on amlodipine. The heart rate is controlled and no role to increase the beta blockers

## 2017-11-02 VITALS
OXYGEN SATURATION: 97 % | SYSTOLIC BLOOD PRESSURE: 127 MMHG | RESPIRATION RATE: 18 BRPM | TEMPERATURE: 98 F | WEIGHT: 179.9 LBS | DIASTOLIC BLOOD PRESSURE: 83 MMHG | HEART RATE: 76 BPM

## 2017-11-02 PROCEDURE — 99285 EMERGENCY DEPT VISIT HI MDM: CPT | Mod: 25

## 2017-11-02 NOTE — ED ADULT TRIAGE NOTE - CHIEF COMPLAINT QUOTE
pt DCed 2 days ago s/p brain surgery for acoustic neuroma arrives stating stitches were leaking some drainage around 1 pm today. No drainage at this time. denies fever chills

## 2017-11-03 ENCOUNTER — INPATIENT (INPATIENT)
Facility: HOSPITAL | Age: 52
LOS: 4 days | Discharge: HOME HEALTH | DRG: 919 | End: 2017-11-08
Attending: NEUROLOGICAL SURGERY | Admitting: NEUROLOGICAL SURGERY
Payer: COMMERCIAL
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
## 2017-11-03 DIAGNOSIS — L76.82 OTHER POSTPROCEDURAL COMPLICATIONS OF SKIN AND SUBCUTANEOUS TISSUE: ICD-10-CM

## 2017-11-03 DIAGNOSIS — Z98.890 OTHER SPECIFIED POSTPROCEDURAL STATES: Chronic | ICD-10-CM

## 2017-11-03 DIAGNOSIS — G03.0 NONPYOGENIC MENINGITIS: ICD-10-CM

## 2017-11-03 PROBLEM — F90.9 ATTENTION-DEFICIT HYPERACTIVITY DISORDER, UNSPECIFIED TYPE: Chronic | Status: ACTIVE | Noted: 2017-10-26

## 2017-11-03 PROBLEM — F41.9 ANXIETY DISORDER, UNSPECIFIED: Chronic | Status: ACTIVE | Noted: 2017-10-26

## 2017-11-03 PROBLEM — I10 ESSENTIAL (PRIMARY) HYPERTENSION: Chronic | Status: ACTIVE | Noted: 2017-10-26

## 2017-11-03 PROBLEM — D33.3 BENIGN NEOPLASM OF CRANIAL NERVES: Chronic | Status: ACTIVE | Noted: 2017-10-26

## 2017-11-03 LAB
ALBUMIN SERPL ELPH-MCNC: 3.8 G/DL — SIGNIFICANT CHANGE UP (ref 3.3–5)
ALP SERPL-CCNC: 32 U/L — LOW (ref 40–120)
ALT FLD-CCNC: 47 U/L — HIGH (ref 10–45)
ANION GAP SERPL CALC-SCNC: 13 MMOL/L — SIGNIFICANT CHANGE UP (ref 5–17)
APTT BLD: 22.9 SEC — LOW (ref 27.5–37.4)
AST SERPL-CCNC: 26 U/L — SIGNIFICANT CHANGE UP (ref 10–40)
BASOPHILS NFR BLD AUTO: 0.1 % — SIGNIFICANT CHANGE UP (ref 0–2)
BILIRUB SERPL-MCNC: 0.5 MG/DL — SIGNIFICANT CHANGE UP (ref 0.2–1.2)
BLD GP AB SCN SERPL QL: NEGATIVE — SIGNIFICANT CHANGE UP
BUN SERPL-MCNC: 17 MG/DL — SIGNIFICANT CHANGE UP (ref 7–23)
CALCIUM SERPL-MCNC: 9 MG/DL — SIGNIFICANT CHANGE UP (ref 8.4–10.5)
CHLORIDE SERPL-SCNC: 98 MMOL/L — SIGNIFICANT CHANGE UP (ref 96–108)
CO2 SERPL-SCNC: 25 MMOL/L — SIGNIFICANT CHANGE UP (ref 22–31)
CREAT SERPL-MCNC: 0.93 MG/DL — SIGNIFICANT CHANGE UP (ref 0.5–1.3)
EOSINOPHIL NFR BLD AUTO: 1.4 % — SIGNIFICANT CHANGE UP (ref 0–6)
GLUCOSE BLDC GLUCOMTR-MCNC: 118 MG/DL — HIGH (ref 70–99)
GLUCOSE SERPL-MCNC: 128 MG/DL — HIGH (ref 70–99)
HCT VFR BLD CALC: 38.5 % — LOW (ref 39–50)
HCT VFR BLD CALC: 38.7 % — LOW (ref 39–50)
HGB BLD-MCNC: 13.5 G/DL — SIGNIFICANT CHANGE UP (ref 13–17)
HGB BLD-MCNC: 13.5 G/DL — SIGNIFICANT CHANGE UP (ref 13–17)
INR BLD: 0.91 — SIGNIFICANT CHANGE UP (ref 0.88–1.16)
LYMPHOCYTES # BLD AUTO: 34.6 % — SIGNIFICANT CHANGE UP (ref 13–44)
MCHC RBC-ENTMCNC: 32.5 PG — SIGNIFICANT CHANGE UP (ref 27–34)
MCHC RBC-ENTMCNC: 32.5 PG — SIGNIFICANT CHANGE UP (ref 27–34)
MCHC RBC-ENTMCNC: 34.9 G/DL — SIGNIFICANT CHANGE UP (ref 32–36)
MCHC RBC-ENTMCNC: 35.1 G/DL — SIGNIFICANT CHANGE UP (ref 32–36)
MCV RBC AUTO: 92.5 FL — SIGNIFICANT CHANGE UP (ref 80–100)
MCV RBC AUTO: 93 FL — SIGNIFICANT CHANGE UP (ref 80–100)
MONOCYTES NFR BLD AUTO: 8.8 % — SIGNIFICANT CHANGE UP (ref 2–14)
NEUTROPHILS NFR BLD AUTO: 55.1 % — SIGNIFICANT CHANGE UP (ref 43–77)
PLATELET # BLD AUTO: 160 K/UL — SIGNIFICANT CHANGE UP (ref 150–400)
PLATELET # BLD AUTO: 165 K/UL — SIGNIFICANT CHANGE UP (ref 150–400)
POTASSIUM SERPL-MCNC: 4 MMOL/L — SIGNIFICANT CHANGE UP (ref 3.5–5.3)
POTASSIUM SERPL-SCNC: 4 MMOL/L — SIGNIFICANT CHANGE UP (ref 3.5–5.3)
PROT SERPL-MCNC: 6.2 G/DL — SIGNIFICANT CHANGE UP (ref 6–8.3)
PROTHROM AB SERPL-ACNC: 10.1 SEC — SIGNIFICANT CHANGE UP (ref 9.8–12.7)
RBC # BLD: 4.16 M/UL — LOW (ref 4.2–5.8)
RBC # BLD: 4.16 M/UL — LOW (ref 4.2–5.8)
RBC # FLD: 11.8 % — SIGNIFICANT CHANGE UP (ref 10.3–16.9)
RBC # FLD: 12.1 % — SIGNIFICANT CHANGE UP (ref 10.3–16.9)
RH IG SCN BLD-IMP: POSITIVE — SIGNIFICANT CHANGE UP
SODIUM SERPL-SCNC: 136 MMOL/L — SIGNIFICANT CHANGE UP (ref 135–145)
WBC # BLD: 7 K/UL — SIGNIFICANT CHANGE UP (ref 3.8–10.5)
WBC # BLD: 7.8 K/UL — SIGNIFICANT CHANGE UP (ref 3.8–10.5)
WBC # FLD AUTO: 7 K/UL — SIGNIFICANT CHANGE UP (ref 3.8–10.5)
WBC # FLD AUTO: 7.8 K/UL — SIGNIFICANT CHANGE UP (ref 3.8–10.5)

## 2017-11-03 PROCEDURE — 70450 CT HEAD/BRAIN W/O DYE: CPT | Mod: 26

## 2017-11-03 PROCEDURE — 99221 1ST HOSP IP/OBS SF/LOW 40: CPT

## 2017-11-03 RX ORDER — VANCOMYCIN HCL 1 G
1000 VIAL (EA) INTRAVENOUS EVERY 12 HOURS
Qty: 0 | Refills: 0 | Status: DISCONTINUED | OUTPATIENT
Start: 2017-11-03 | End: 2017-11-05

## 2017-11-03 RX ORDER — OXYCODONE AND ACETAMINOPHEN 5; 325 MG/1; MG/1
1 TABLET ORAL ONCE
Qty: 0 | Refills: 0 | Status: DISCONTINUED | OUTPATIENT
Start: 2017-11-03 | End: 2017-11-03

## 2017-11-03 RX ORDER — GABAPENTIN 400 MG/1
400 CAPSULE ORAL EVERY 8 HOURS
Qty: 0 | Refills: 0 | Status: DISCONTINUED | OUTPATIENT
Start: 2017-11-03 | End: 2017-11-07

## 2017-11-03 RX ORDER — TRAMADOL HYDROCHLORIDE 50 MG/1
50 TABLET ORAL EVERY 6 HOURS
Qty: 0 | Refills: 0 | Status: DISCONTINUED | OUTPATIENT
Start: 2017-11-03 | End: 2017-11-03

## 2017-11-03 RX ORDER — ONDANSETRON 8 MG/1
4 TABLET, FILM COATED ORAL EVERY 6 HOURS
Qty: 0 | Refills: 0 | Status: DISCONTINUED | OUTPATIENT
Start: 2017-11-03 | End: 2017-11-08

## 2017-11-03 RX ORDER — OXYCODONE AND ACETAMINOPHEN 5; 325 MG/1; MG/1
1 TABLET ORAL EVERY 6 HOURS
Qty: 0 | Refills: 0 | Status: DISCONTINUED | OUTPATIENT
Start: 2017-11-03 | End: 2017-11-03

## 2017-11-03 RX ORDER — ZOLPIDEM TARTRATE 10 MG/1
5 TABLET ORAL AT BEDTIME
Qty: 0 | Refills: 0 | Status: DISCONTINUED | OUTPATIENT
Start: 2017-11-03 | End: 2017-11-08

## 2017-11-03 RX ORDER — DOCUSATE SODIUM 100 MG
100 CAPSULE ORAL THREE TIMES A DAY
Qty: 0 | Refills: 0 | Status: DISCONTINUED | OUTPATIENT
Start: 2017-11-03 | End: 2017-11-08

## 2017-11-03 RX ORDER — METOPROLOL TARTRATE 50 MG
50 TABLET ORAL
Qty: 0 | Refills: 0 | Status: DISCONTINUED | OUTPATIENT
Start: 2017-11-03 | End: 2017-11-08

## 2017-11-03 RX ORDER — NICOTINE POLACRILEX 2 MG
1 GUM BUCCAL DAILY
Qty: 0 | Refills: 0 | Status: DISCONTINUED | OUTPATIENT
Start: 2017-11-03 | End: 2017-11-08

## 2017-11-03 RX ORDER — IBUPROFEN 200 MG
600 TABLET ORAL EVERY 8 HOURS
Qty: 0 | Refills: 0 | Status: DISCONTINUED | OUTPATIENT
Start: 2017-11-03 | End: 2017-11-04

## 2017-11-03 RX ORDER — SENNA PLUS 8.6 MG/1
2 TABLET ORAL AT BEDTIME
Qty: 0 | Refills: 0 | Status: DISCONTINUED | OUTPATIENT
Start: 2017-11-03 | End: 2017-11-08

## 2017-11-03 RX ORDER — ALPRAZOLAM 0.25 MG
1 TABLET ORAL
Qty: 0 | Refills: 0 | Status: DISCONTINUED | OUTPATIENT
Start: 2017-11-03 | End: 2017-11-08

## 2017-11-03 RX ORDER — METHOCARBAMOL 500 MG/1
750 TABLET, FILM COATED ORAL EVERY 8 HOURS
Qty: 0 | Refills: 0 | Status: DISCONTINUED | OUTPATIENT
Start: 2017-11-03 | End: 2017-11-07

## 2017-11-03 RX ORDER — SODIUM CHLORIDE 9 MG/ML
1000 INJECTION INTRAMUSCULAR; INTRAVENOUS; SUBCUTANEOUS
Qty: 0 | Refills: 0 | Status: DISCONTINUED | OUTPATIENT
Start: 2017-11-03 | End: 2017-11-04

## 2017-11-03 RX ADMIN — OXYCODONE AND ACETAMINOPHEN 1 TABLET(S): 5; 325 TABLET ORAL at 09:51

## 2017-11-03 RX ADMIN — Medication 50 MILLIGRAM(S): at 18:16

## 2017-11-03 RX ADMIN — Medication 250 MILLIGRAM(S): at 18:16

## 2017-11-03 RX ADMIN — SODIUM CHLORIDE 80 MILLILITER(S): 9 INJECTION INTRAMUSCULAR; INTRAVENOUS; SUBCUTANEOUS at 09:27

## 2017-11-03 RX ADMIN — Medication 1 MILLIGRAM(S): at 18:16

## 2017-11-03 RX ADMIN — OXYCODONE AND ACETAMINOPHEN 1 TABLET(S): 5; 325 TABLET ORAL at 02:23

## 2017-11-03 RX ADMIN — ZOLPIDEM TARTRATE 5 MILLIGRAM(S): 10 TABLET ORAL at 03:58

## 2017-11-03 RX ADMIN — Medication 600 MILLIGRAM(S): at 16:00

## 2017-11-03 RX ADMIN — OXYCODONE AND ACETAMINOPHEN 1 TABLET(S): 5; 325 TABLET ORAL at 12:01

## 2017-11-03 RX ADMIN — GABAPENTIN 400 MILLIGRAM(S): 400 CAPSULE ORAL at 22:03

## 2017-11-03 RX ADMIN — Medication 250 MILLIGRAM(S): at 06:00

## 2017-11-03 RX ADMIN — Medication 100 MILLIGRAM(S): at 22:03

## 2017-11-03 RX ADMIN — OXYCODONE AND ACETAMINOPHEN 1 TABLET(S): 5; 325 TABLET ORAL at 01:23

## 2017-11-03 RX ADMIN — Medication 1 MILLIGRAM(S): at 09:27

## 2017-11-03 RX ADMIN — Medication 100 MILLIGRAM(S): at 15:35

## 2017-11-03 RX ADMIN — Medication 1 PATCH: at 12:25

## 2017-11-03 RX ADMIN — Medication 50 MILLIGRAM(S): at 09:27

## 2017-11-03 RX ADMIN — Medication 100 MILLIGRAM(S): at 09:27

## 2017-11-03 RX ADMIN — METHOCARBAMOL 750 MILLIGRAM(S): 500 TABLET, FILM COATED ORAL at 22:03

## 2017-11-03 NOTE — ED ADULT NURSE NOTE - OBJECTIVE STATEMENT
pt. presented with c/o clear yellow with tinge of pink discharge from the surgical wound behind Lt ear. pt. presented with c/o discharge from the surgical wound behind Lt ear. pt. reports surgery for acoustic neuroma last friday with discharge from hospital this tuesday, pt. states he noted discharge and pain exacerbation since yesterday, discharge is described as scant clear yellow with tinge of pink. surgical wound with well approximated edges and stitches noted behind left ear, no swelling, redness, bleeding or discharge noted, pt. is A&Ox3, communicates w/o difficulty, denies chest pain, shortness of breath, n/v/d, fever, chills.

## 2017-11-03 NOTE — H&P ADULT - NSHPPHYSICALEXAM_GEN_ALL_CORE
AA&OX3, NAD, coherent speech,  CNs II-XII grossly intact,  motor 5/5 x 4 extr, no drift,  sensation to LT grossly intact throughout,  Head: L incision no drainage could be expressed, no visible or palpable collections, mild erythema,

## 2017-11-03 NOTE — BRIEF OPERATIVE NOTE - PROCEDURE
<<-----Click on this checkbox to enter Procedure Meatoplasty of ear  11/03/2017  left  Active  [facility]  Mastoidectomy  11/03/2017    Active  [facility]

## 2017-11-03 NOTE — PROGRESS NOTE ADULT - SUBJECTIVE AND OBJECTIVE BOX
Subjective: Seen/evaluated at bedside.  Admitted overnight for wound drainage.  Cultures taken at bedside.  Dr. Simms placed dermabond over inferior portion of wound and placed headwrap prior to me seeing him.  Wound continues to be dry.  No acute events.  No new complaints    T(C): 37.1 (11-03-17 @ 06:05), Max: 37.1 (11-03-17 @ 06:05)  HR: 73 (11-03-17 @ 09:40) (65 - 76)  BP: 129/78 (11-03-17 @ 09:40) (127/72 - 142/85)  RR: 16 (11-03-17 @ 09:40) (16 - 18)  SpO2: 96% (11-03-17 @ 09:40) (96% - 98%)  Wt(kg): --    Exam: A/Ox3, FC, speech clear  NUNO 5/5 strength  Sensation intact light touch all dermatomes  Baseline left facial weakness and decreased hearing left ear, cranial nerves otherwise intact    CBC Full  -  ( 03 Nov 2017 06:09 )  WBC Count : 7.0 K/uL  Hemoglobin : 13.5 g/dL  Hematocrit : 38.7 %  Platelet Count - Automated : 160 K/uL  Mean Cell Volume : 93.0 fL  Mean Cell Hemoglobin : 32.5 pg  Mean Cell Hemoglobin Concentration : 34.9 g/dL  Auto Neutrophil # : x  Auto Lymphocyte # : x  Auto Monocyte # : x  Auto Eosinophil # : x  Auto Basophil # : x  Auto Neutrophil % : x  Auto Lymphocyte % : x  Auto Monocyte % : x  Auto Eosinophil % : x  Auto Basophil % : x    11-03    136  |  98  |  17  ----------------------------<  128<H>  4.0   |  25  |  0.93    Ca    9.0      03 Nov 2017 01:32    TPro  6.2  /  Alb  3.8  /  TBili  0.5  /  DBili  x   /  AST  26  /  ALT  47<H>  /  AlkPhos  32<L>  11-03    PT/INR - ( 03 Nov 2017 06:10 )   PT: 10.1 sec;   INR: 0.91          PTT - ( 03 Nov 2017 06:10 )  PTT:22.9 sec      Wound: headwrap in place, C/D/I    Assessment/Plan: s/p resection of acoustic neuroma c/b wound leakage  -Monitor wound for drainage  -Continue Vancomycin  -D/C in AM if wound dry  -OOB  -D/W Dr. Simms

## 2017-11-03 NOTE — H&P ADULT - HISTORY OF PRESENT ILLNESS
Pt is 51yo male, well know to [facility] Neurosurgery service s/p L acoustic neuroma resection 10/27 by Dr. Simms and Dr. Nova, discharged home 10/30/2017, presents to the ED [facility] with c/o drainage from the incision site, which started 11/2/2017.  Pt denies fevers/chills, neck pain, photophobia, n/v, headache, sob, cp.

## 2017-11-03 NOTE — H&P ADULT - ASSESSMENT
Pt is 53yo male s/p resection L acoustic neuroma 10/27/2017, presents to [facility] ED with c/o wound drainage

## 2017-11-03 NOTE — ED PROVIDER NOTE - OBJECTIVE STATEMENT
52M hx htn, adhd, anxiety, s/p craniotomy and removal of L acoustic neuroma. pt was discharged 2 days ago.  surgery with Dr. Simms and Dr. Pizano.  pt states tonight noted yellowish drainage from incision site. +headache. no fevers. no vomiting.

## 2017-11-03 NOTE — CONSULT NOTE ADULT - SUBJECTIVE AND OBJECTIVE BOX
NEUROSURGERY PAIN MANAGEMENT PROGRESS NOTE    OVERNIGHT EVENTS:  No acute overnight events    PLAN/RECOMMENDATIONS:  - Gabapentin 400mg TID for opioid sparing effect, for anxiety--comorbidity  - Motrin 600mg TID  - Robaxin 750mg TID    HPI:  Pt is 53yo male, well know to [facility] Neurosurgery service s/p L acoustic neuroma resection 10/27 by Dr. Simms and Dr. Nova, discharged home 10/30/2017, presents to the ED [facility] with c/o drainage from the incision site, which started 11/2/2017.  Pt denies fevers/chills, neck pain, photophobia, n/v, headache, sob, cp. (03 Nov 2017 03:06)      PAST MEDICAL & SURGICAL HISTORY:  Anxiety disorder  ADHD (attention deficit hyperactivity disorder)  HTN (hypertension)  Acoustic neuroma  S/P excision of acoustic neuroma      FAMILY HISTORY:  No pertinent family history in first degree relatives      Allergies    No Known Allergies    Intolerances        PAIN MEDICATIONS:  ALPRAZolam 1 milliGRAM(s) Oral two times a day  ondansetron Injectable 4 milliGRAM(s) IV Push every 6 hours PRN  traMADol 50 milliGRAM(s) Oral every 6 hours PRN  zolpidem 5 milliGRAM(s) Oral at bedtime PRN  zolpidem 5 milliGRAM(s) Oral at bedtime PRN    Heme:    Antibiotics:  vancomycin  IVPB 1000 milliGRAM(s) IV Intermittent every 12 hours    Cardiovascular:  metoprolol     tartrate 50 milliGRAM(s) Oral two times a day    GI:  docusate sodium 100 milliGRAM(s) Oral three times a day  senna 2 Tablet(s) Oral at bedtime PRN    Endocrine:    All Other Medications:  nicotine -  14 mG/24Hr(s) Patch 1 patch Transdermal daily  sodium chloride 0.9%. 1000 milliLiter(s) IV Continuous <Continuous>      Vital Signs Last 24 Hrs  T(C): 36.2 (03 Nov 2017 14:37), Max: 37.1 (03 Nov 2017 06:05)  T(F): 97.2 (03 Nov 2017 14:37), Max: 98.7 (03 Nov 2017 06:05)  HR: 61 (03 Nov 2017 14:37) (61 - 76)  BP: 133/87 (03 Nov 2017 14:37) (127/72 - 142/85)  BP(mean): --  RR: 16 (03 Nov 2017 14:37) (16 - 18)  SpO2: 95% (03 Nov 2017 14:37) (95% - 98%)    LABS:                        13.5   7.0   )-----------( 160      ( 03 Nov 2017 06:09 )             38.7     11-03    136  |  98  |  17  ----------------------------<  128<H>  4.0   |  25  |  0.93    Ca    9.0      03 Nov 2017 01:32    TPro  6.2  /  Alb  3.8  /  TBili  0.5  /  DBili  x   /  AST  26  /  ALT  47<H>  /  AlkPhos  32<L>  11-03    PT/INR - ( 03 Nov 2017 06:10 )   PT: 10.1 sec;   INR: 0.91          PTT - ( 03 Nov 2017 06:10 )  PTT:22.9 sec      REVIEW OF SYSTEMS:  CONSTITUTIONAL: No fever or fatigue O/N.   NECK: No pain or stiffness  RESPIRATORY: No cough, wheezing; No shortness of breath  CARDIOVASCULAR: No chest pain, palpitations.   GASTROINTESTINAL: Pt reports passing gas. BM yesterday. No abdominal or epigastric pain. No nausea, vomiting.  NEUROLOGICAL: Significant localized L sided headache incisional, throbbing quality. Denies loss of strength, facial numbness. No dizziness or lightheadedness with pain medications. Denies relief with Tramadol, reports history of relief with Oxy-IR  MUSCULOSKELETAL: Reports L sided neck muscle stiffness/cramping No joint pain or swelling.      FUNCTIONAL ASSESSMENT:  PAIN SCORE AT REST:     4/10    SCALE USED: (1-10 VAS)  PAIN SCORE WITH ACTIVITY:   4/10      SCALE USED: (1-10 VAS)    PAIN ASSESSMENT:  Pt complains of L sided headache which is incisional, continuous and throbbing. Pt reports associated muscle spasms in his L trap.    PHYSICAL EXAM  GENERAL: NAD  NERVOUS SYSTEM:    Alert & Oriented X3, Good concentration;   Cranial nerves grossly intact  Motor exam:  NUNO x 4, 5/5 in 4 extr.   CHEST/LUNG: Clear to auscultation bilaterally; No rales, rhonchi, wheezing, or rubs  HEART: Regular rate and rhythm; No murmurs, rubs, or gallops  ABDOMEN: Tight, nontender. Bowel sounds present  SKIN: No rashes or lesions. Incision covered by headwrap.      ASSESSMENT:   53yo M w/ c/o drainage from incision site s/p acoustic neuroma resection 10/27    PLAN:   1. Opioids  Since yesterday, has been on Tramadol. Has hx of substance abuse.     PLAN: Dc Tramadol.    2. Neuropathics  Pt currently denies neuropathic pain. No numbness/tingling/electric shock like sensation in LE/UE b.l.  PLAN: Start Gabapentin 400mg TID for opioid sparing effect. Pt also has anxiety.     3. Adjuvants  Pt complaining of L sided muscle spasms/cramping in neck which extends rom neck into L trap.   PLAN: Start Robaxin 750mg TID. Start Motrin 600mg TID, will address throbbing quality of pain, inflammatory s/sx.    4. Prophylactic:   Bowel regimen: Docusate Senna;    Nausea PRN: Zofran PRN for Nausea, pt does not c/o current    5. Functional Goals:   Pt will get OOB with PT today. Pt will resume previous level of activity without impairment from surgery.     6. Additional Consults:   None recommended.     7. Additional Labs/Imaging:   None recommended.     8. Follow up, Discharge Planning:   Patient is set for discharge to: Home  Discharge is pending: Eval of wound  Pain Management follow up plan: F/u inpatient, re refer outpatient.

## 2017-11-03 NOTE — H&P ADULT - NSHPLABSRESULTS_GEN_ALL_CORE
13.5   7.8   )-----------( 165      ( 03 Nov 2017 01:33 )             38.5   11-03    136  |  98  |  17  ----------------------------<  128<H>  4.0   |  25  |  0.93    Ca    9.0      03 Nov 2017 01:32    TPro  6.2  /  Alb  3.8  /  TBili  0.5  /  DBili  x   /  AST  26  /  ALT  47<H>  /  AlkPhos  32<L>  11-03  < from: CT Head No Cont (11.03.17 @ 02:35) >    No hydrocephalus, midline shift, acute intracranial hemorrhage or   demarcated territorial infarct.    Recent left lateral occipital craniotomy with partial posterior   mastoidectomy for excision of a left IAC neuroma.    < end of copied text >

## 2017-11-04 LAB
APPEARANCE UR: CLEAR — SIGNIFICANT CHANGE UP
BILIRUB UR-MCNC: NEGATIVE — SIGNIFICANT CHANGE UP
COLOR SPEC: YELLOW — SIGNIFICANT CHANGE UP
DIFF PNL FLD: (no result)
GLUCOSE UR QL: NEGATIVE — SIGNIFICANT CHANGE UP
KETONES UR-MCNC: NEGATIVE — SIGNIFICANT CHANGE UP
LEUKOCYTE ESTERASE UR-ACNC: NEGATIVE — SIGNIFICANT CHANGE UP
NIGHT BLUE STAIN TISS: SIGNIFICANT CHANGE UP
NITRITE UR-MCNC: NEGATIVE — SIGNIFICANT CHANGE UP
PH UR: 5.5 — SIGNIFICANT CHANGE UP (ref 5–8)
PROT UR-MCNC: NEGATIVE MG/DL — SIGNIFICANT CHANGE UP
SP GR SPEC: <=1.005 — SIGNIFICANT CHANGE UP (ref 1–1.03)
SPECIMEN SOURCE: SIGNIFICANT CHANGE UP
UROBILINOGEN FLD QL: 0.2 E.U./DL — SIGNIFICANT CHANGE UP
VANCOMYCIN TROUGH SERPL-MCNC: 10.7 UG/ML — SIGNIFICANT CHANGE UP (ref 10–20)

## 2017-11-04 PROCEDURE — 71010: CPT | Mod: 26

## 2017-11-04 RX ORDER — ZOLPIDEM TARTRATE 10 MG/1
1 TABLET ORAL
Qty: 30 | Refills: 0 | OUTPATIENT
Start: 2017-11-04 | End: 2017-12-04

## 2017-11-04 RX ORDER — OXYCODONE AND ACETAMINOPHEN 5; 325 MG/1; MG/1
1 TABLET ORAL EVERY 4 HOURS
Qty: 0 | Refills: 0 | Status: DISCONTINUED | OUTPATIENT
Start: 2017-11-04 | End: 2017-11-05

## 2017-11-04 RX ADMIN — Medication 600 MILLIGRAM(S): at 01:12

## 2017-11-04 RX ADMIN — Medication 100 MILLIGRAM(S): at 05:08

## 2017-11-04 RX ADMIN — OXYCODONE AND ACETAMINOPHEN 1 TABLET(S): 5; 325 TABLET ORAL at 21:04

## 2017-11-04 RX ADMIN — Medication 1 PATCH: at 14:10

## 2017-11-04 RX ADMIN — Medication 1 MILLIGRAM(S): at 05:08

## 2017-11-04 RX ADMIN — GABAPENTIN 400 MILLIGRAM(S): 400 CAPSULE ORAL at 05:08

## 2017-11-04 RX ADMIN — Medication 250 MILLIGRAM(S): at 17:58

## 2017-11-04 RX ADMIN — Medication 50 MILLIGRAM(S): at 17:58

## 2017-11-04 RX ADMIN — OXYCODONE AND ACETAMINOPHEN 1 TABLET(S): 5; 325 TABLET ORAL at 20:04

## 2017-11-04 RX ADMIN — ZOLPIDEM TARTRATE 5 MILLIGRAM(S): 10 TABLET ORAL at 05:08

## 2017-11-04 RX ADMIN — Medication 250 MILLIGRAM(S): at 06:11

## 2017-11-04 RX ADMIN — Medication 100 MILLIGRAM(S): at 14:11

## 2017-11-04 RX ADMIN — OXYCODONE AND ACETAMINOPHEN 1 TABLET(S): 5; 325 TABLET ORAL at 11:35

## 2017-11-04 RX ADMIN — GABAPENTIN 400 MILLIGRAM(S): 400 CAPSULE ORAL at 22:03

## 2017-11-04 RX ADMIN — METHOCARBAMOL 750 MILLIGRAM(S): 500 TABLET, FILM COATED ORAL at 22:38

## 2017-11-04 RX ADMIN — METHOCARBAMOL 750 MILLIGRAM(S): 500 TABLET, FILM COATED ORAL at 05:08

## 2017-11-04 RX ADMIN — ZOLPIDEM TARTRATE 5 MILLIGRAM(S): 10 TABLET ORAL at 23:24

## 2017-11-04 RX ADMIN — OXYCODONE AND ACETAMINOPHEN 1 TABLET(S): 5; 325 TABLET ORAL at 15:46

## 2017-11-04 RX ADMIN — Medication 600 MILLIGRAM(S): at 00:12

## 2017-11-04 RX ADMIN — GABAPENTIN 400 MILLIGRAM(S): 400 CAPSULE ORAL at 14:11

## 2017-11-04 RX ADMIN — OXYCODONE AND ACETAMINOPHEN 1 TABLET(S): 5; 325 TABLET ORAL at 16:50

## 2017-11-04 RX ADMIN — METHOCARBAMOL 750 MILLIGRAM(S): 500 TABLET, FILM COATED ORAL at 14:10

## 2017-11-04 RX ADMIN — OXYCODONE AND ACETAMINOPHEN 1 TABLET(S): 5; 325 TABLET ORAL at 12:35

## 2017-11-04 RX ADMIN — Medication 50 MILLIGRAM(S): at 05:08

## 2017-11-04 RX ADMIN — ZOLPIDEM TARTRATE 5 MILLIGRAM(S): 10 TABLET ORAL at 00:08

## 2017-11-04 RX ADMIN — Medication 1 MILLIGRAM(S): at 14:19

## 2017-11-04 RX ADMIN — Medication 100 MILLIGRAM(S): at 22:03

## 2017-11-04 NOTE — PROGRESS NOTE ADULT - SUBJECTIVE AND OBJECTIVE BOX
HPI:  Pt is 51yo male, well know to [facility] Neurosurgery service s/p L acoustic neuroma resection 10/27 by Dr. Simms and Dr. Nova, discharged home 10/30/2017, presents to the ED [facility] with c/o drainage from the incision site, which started 11/2/2017.  Pt denies fevers/chills, neck pain, photophobia, n/v, headache, sob, cp. (03 Nov 2017 03:06)    OVERNIGHT EVENTS: No significant overnight. Pain is well controlled. No drainage from scalp overnight.  Vital Signs Last 24 Hrs  T(C): 36.4 (04 Nov 2017 09:43), Max: 37 (04 Nov 2017 05:36)  T(F): 97.6 (04 Nov 2017 09:43), Max: 98.6 (04 Nov 2017 05:36)  HR: 64 (04 Nov 2017 09:43) (61 - 65)  BP: 124/82 (04 Nov 2017 09:43) (119/80 - 135/84)  BP(mean): --  RR: 17 (04 Nov 2017 09:43) (16 - 17)  SpO2: 99% (04 Nov 2017 09:43) (95% - 99%)    I&O's Summary    03 Nov 2017 07:01  -  04 Nov 2017 07:00  --------------------------------------------------------  IN: 1590 mL / OUT: 1050 mL / NET: 540 mL    04 Nov 2017 08:01  -  04 Nov 2017 11:25  --------------------------------------------------------  IN: 580 mL / OUT: 650 mL / NET: -70 mL        PHYSICAL EXAM:  Neurological: A&O x 3 NAD, PERRL, EOMI, minimal left facial weakness is improved.  Head: left scalp incision is dry and clean no minimal soft palpable fluid collection at distal part of wound.  No erythema no edema    Motor exam:        [ x]Upper extremity              Bi(c5)  WE(c6)  EE(c7)   FF(c8)                                                R         5/5        5/5        5/5       5/5                                               L          5/5        5/5        5/5       5/5         [x] Lower extremeity          HF(l2)   KE(l3)    TA(l4)   EHL(l5)  GS(s1)                                                 R        5/5        5/5        5/5       5/5         5/5                                               L         5/5        5/5       5/5       5/5          5/5                                                       [ x] warm well perfused; capillary refill <3 seconds     Sensation: [x] intact to light touch  [] decreased     LABS:                        13.5   7.0   )-----------( 160      ( 03 Nov 2017 06:09 )             38.7     11-03    136  |  98  |  17  ----------------------------<  128<H>  4.0   |  25  |  0.93    Ca    9.0      03 Nov 2017 01:32    TPro  6.2  /  Alb  3.8  /  TBili  0.5  /  DBili  x   /  AST  26  /  ALT  47<H>  /  AlkPhos  32<L>  11-03    PT/INR - ( 03 Nov 2017 06:10 )   PT: 10.1 sec;   INR: 0.91          PTT - ( 03 Nov 2017 06:10 )  PTT:22.9 sec        CAPILLARY BLOOD GLUCOSE      POCT Blood Glucose.: 118 mg/dL (03 Nov 2017 16:51)      Drug Levels: [] N/A    CSF Analysis: [] N/A      Allergies    No Known Allergies    Intolerances      MEDICATIONS:  Antibiotics:  vancomycin  IVPB 1000 milliGRAM(s) IV Intermittent every 12 hours    Neuro:  ALPRAZolam 1 milliGRAM(s) Oral two times a day  gabapentin 400 milliGRAM(s) Oral every 8 hours  methocarbamol 750 milliGRAM(s) Oral every 8 hours  ondansetron Injectable 4 milliGRAM(s) IV Push every 6 hours PRN  oxyCODONE    5 mG/acetaminophen 325 mG 1 Tablet(s) Oral every 4 hours PRN  zolpidem 5 milliGRAM(s) Oral at bedtime PRN  zolpidem 5 milliGRAM(s) Oral at bedtime PRN    Anticoagulation:    OTHER:  docusate sodium 100 milliGRAM(s) Oral three times a day  metoprolol     tartrate 50 milliGRAM(s) Oral two times a day  nicotine -  14 mG/24Hr(s) Patch 1 patch Transdermal daily  senna 2 Tablet(s) Oral at bedtime PRN    IVF:    CULTURES:    RADIOLOGY & ADDITIONAL TESTS:      ASSESSMENT:  52y Male s/p craniotomy for resection of left acoustic tumor    PLAN: continue vanco 1 more day  no shower  head of bed at 45 to 60 degrees.  pain cintrol      DVT PROPHYLAXIS:  [x] Venodynes                                [x] Heparin/Lovenox    DISPOSITION: home tomorrow

## 2017-11-05 LAB
ALBUMIN SERPL ELPH-MCNC: 3.6 G/DL — SIGNIFICANT CHANGE UP (ref 3.3–5)
ALP SERPL-CCNC: 33 U/L — LOW (ref 40–120)
ALT FLD-CCNC: 35 U/L — SIGNIFICANT CHANGE UP (ref 10–45)
ANION GAP SERPL CALC-SCNC: 9 MMOL/L — SIGNIFICANT CHANGE UP (ref 5–17)
AST SERPL-CCNC: 19 U/L — SIGNIFICANT CHANGE UP (ref 10–40)
BILIRUB SERPL-MCNC: 1 MG/DL — SIGNIFICANT CHANGE UP (ref 0.2–1.2)
BUN SERPL-MCNC: 15 MG/DL — SIGNIFICANT CHANGE UP (ref 7–23)
CALCIUM SERPL-MCNC: 9.1 MG/DL — SIGNIFICANT CHANGE UP (ref 8.4–10.5)
CHLORIDE SERPL-SCNC: 94 MMOL/L — LOW (ref 96–108)
CO2 SERPL-SCNC: 29 MMOL/L — SIGNIFICANT CHANGE UP (ref 22–31)
CREAT SERPL-MCNC: 1.05 MG/DL — SIGNIFICANT CHANGE UP (ref 0.5–1.3)
GLUCOSE SERPL-MCNC: 111 MG/DL — HIGH (ref 70–99)
HCT VFR BLD CALC: 39.6 % — SIGNIFICANT CHANGE UP (ref 39–50)
HGB BLD-MCNC: 13.7 G/DL — SIGNIFICANT CHANGE UP (ref 13–17)
MCHC RBC-ENTMCNC: 32.4 PG — SIGNIFICANT CHANGE UP (ref 27–34)
MCHC RBC-ENTMCNC: 34.6 G/DL — SIGNIFICANT CHANGE UP (ref 32–36)
MCV RBC AUTO: 93.6 FL — SIGNIFICANT CHANGE UP (ref 80–100)
PLATELET # BLD AUTO: 153 K/UL — SIGNIFICANT CHANGE UP (ref 150–400)
POTASSIUM SERPL-MCNC: 4.5 MMOL/L — SIGNIFICANT CHANGE UP (ref 3.5–5.3)
POTASSIUM SERPL-SCNC: 4.5 MMOL/L — SIGNIFICANT CHANGE UP (ref 3.5–5.3)
PROT SERPL-MCNC: 6.4 G/DL — SIGNIFICANT CHANGE UP (ref 6–8.3)
RBC # BLD: 4.23 M/UL — SIGNIFICANT CHANGE UP (ref 4.2–5.8)
RBC # FLD: 12.3 % — SIGNIFICANT CHANGE UP (ref 10.3–16.9)
SODIUM SERPL-SCNC: 132 MMOL/L — LOW (ref 135–145)
WBC # BLD: 8 K/UL — SIGNIFICANT CHANGE UP (ref 3.8–10.5)
WBC # FLD AUTO: 8 K/UL — SIGNIFICANT CHANGE UP (ref 3.8–10.5)

## 2017-11-05 RX ORDER — ACETAMINOPHEN 500 MG
1000 TABLET ORAL ONCE
Qty: 0 | Refills: 0 | Status: COMPLETED | OUTPATIENT
Start: 2017-11-05 | End: 2017-11-05

## 2017-11-05 RX ORDER — HYDROMORPHONE HYDROCHLORIDE 2 MG/ML
2 INJECTION INTRAMUSCULAR; INTRAVENOUS; SUBCUTANEOUS EVERY 4 HOURS
Qty: 0 | Refills: 0 | Status: DISCONTINUED | OUTPATIENT
Start: 2017-11-05 | End: 2017-11-08

## 2017-11-05 RX ORDER — ENOXAPARIN SODIUM 100 MG/ML
40 INJECTION SUBCUTANEOUS AT BEDTIME
Qty: 0 | Refills: 0 | Status: DISCONTINUED | OUTPATIENT
Start: 2017-11-05 | End: 2017-11-08

## 2017-11-05 RX ORDER — HYDROMORPHONE HYDROCHLORIDE 2 MG/ML
4 INJECTION INTRAMUSCULAR; INTRAVENOUS; SUBCUTANEOUS EVERY 4 HOURS
Qty: 0 | Refills: 0 | Status: DISCONTINUED | OUTPATIENT
Start: 2017-11-05 | End: 2017-11-08

## 2017-11-05 RX ORDER — CYCLOBENZAPRINE HYDROCHLORIDE 10 MG/1
5 TABLET, FILM COATED ORAL THREE TIMES A DAY
Qty: 0 | Refills: 0 | Status: DISCONTINUED | OUTPATIENT
Start: 2017-11-05 | End: 2017-11-06

## 2017-11-05 RX ADMIN — Medication 1 PATCH: at 11:15

## 2017-11-05 RX ADMIN — ZOLPIDEM TARTRATE 5 MILLIGRAM(S): 10 TABLET ORAL at 23:00

## 2017-11-05 RX ADMIN — METHOCARBAMOL 750 MILLIGRAM(S): 500 TABLET, FILM COATED ORAL at 06:06

## 2017-11-05 RX ADMIN — Medication 50 MILLIGRAM(S): at 17:09

## 2017-11-05 RX ADMIN — OXYCODONE AND ACETAMINOPHEN 1 TABLET(S): 5; 325 TABLET ORAL at 07:06

## 2017-11-05 RX ADMIN — Medication 250 MILLIGRAM(S): at 06:07

## 2017-11-05 RX ADMIN — Medication 1 MILLIGRAM(S): at 17:09

## 2017-11-05 RX ADMIN — Medication 1 MILLIGRAM(S): at 04:32

## 2017-11-05 RX ADMIN — OXYCODONE AND ACETAMINOPHEN 1 TABLET(S): 5; 325 TABLET ORAL at 06:06

## 2017-11-05 RX ADMIN — METHOCARBAMOL 750 MILLIGRAM(S): 500 TABLET, FILM COATED ORAL at 14:07

## 2017-11-05 RX ADMIN — HYDROMORPHONE HYDROCHLORIDE 4 MILLIGRAM(S): 2 INJECTION INTRAMUSCULAR; INTRAVENOUS; SUBCUTANEOUS at 22:10

## 2017-11-05 RX ADMIN — GABAPENTIN 400 MILLIGRAM(S): 400 CAPSULE ORAL at 22:12

## 2017-11-05 RX ADMIN — OXYCODONE AND ACETAMINOPHEN 1 TABLET(S): 5; 325 TABLET ORAL at 00:08

## 2017-11-05 RX ADMIN — METHOCARBAMOL 750 MILLIGRAM(S): 500 TABLET, FILM COATED ORAL at 22:12

## 2017-11-05 RX ADMIN — ENOXAPARIN SODIUM 40 MILLIGRAM(S): 100 INJECTION SUBCUTANEOUS at 22:11

## 2017-11-05 RX ADMIN — Medication 50 MILLIGRAM(S): at 06:06

## 2017-11-05 RX ADMIN — HYDROMORPHONE HYDROCHLORIDE 4 MILLIGRAM(S): 2 INJECTION INTRAMUSCULAR; INTRAVENOUS; SUBCUTANEOUS at 18:07

## 2017-11-05 RX ADMIN — Medication 1000 MILLIGRAM(S): at 12:13

## 2017-11-05 RX ADMIN — ZOLPIDEM TARTRATE 5 MILLIGRAM(S): 10 TABLET ORAL at 01:45

## 2017-11-05 RX ADMIN — Medication 100 MILLIGRAM(S): at 22:12

## 2017-11-05 RX ADMIN — GABAPENTIN 400 MILLIGRAM(S): 400 CAPSULE ORAL at 14:07

## 2017-11-05 RX ADMIN — ZOLPIDEM TARTRATE 5 MILLIGRAM(S): 10 TABLET ORAL at 18:11

## 2017-11-05 RX ADMIN — HYDROMORPHONE HYDROCHLORIDE 4 MILLIGRAM(S): 2 INJECTION INTRAMUSCULAR; INTRAVENOUS; SUBCUTANEOUS at 22:59

## 2017-11-05 RX ADMIN — OXYCODONE AND ACETAMINOPHEN 1 TABLET(S): 5; 325 TABLET ORAL at 01:08

## 2017-11-05 RX ADMIN — HYDROMORPHONE HYDROCHLORIDE 4 MILLIGRAM(S): 2 INJECTION INTRAMUSCULAR; INTRAVENOUS; SUBCUTANEOUS at 14:07

## 2017-11-05 RX ADMIN — Medication 100 MILLIGRAM(S): at 06:06

## 2017-11-05 RX ADMIN — HYDROMORPHONE HYDROCHLORIDE 4 MILLIGRAM(S): 2 INJECTION INTRAMUSCULAR; INTRAVENOUS; SUBCUTANEOUS at 15:07

## 2017-11-05 RX ADMIN — Medication 400 MILLIGRAM(S): at 11:58

## 2017-11-05 RX ADMIN — Medication 100 MILLIGRAM(S): at 14:07

## 2017-11-05 RX ADMIN — GABAPENTIN 400 MILLIGRAM(S): 400 CAPSULE ORAL at 06:06

## 2017-11-05 NOTE — PROGRESS NOTE ADULT - SUBJECTIVE AND OBJECTIVE BOX
HPI:  Pt is 53yo male, well know to [facility] Neurosurgery service s/p L acoustic neuroma resection 10/27 by Dr. Simms and Dr. Nova, discharged home 10/30/2017, presents to the ED [facility] with c/o drainage from the incision site, which started 11/2/2017.  Pt denies fevers/chills, neck pain, photophobia, n/v, headache, sob, cp. (03 Nov 2017 03:06)    OVERNIGHT EVENTS: Overnight Pt spiked fever 101F. Incision site dry overnight. Pt states mild headache, neck/back pain that started yesterday.  Denies acute changes in vision, photophobia, acute weakness/loss of sensation of extremities.     Vital Signs Last 24 Hrs  T(C): 37.4 (05 Nov 2017 00:23), Max: 38.3 (04 Nov 2017 20:00)  T(F): 99.4 (05 Nov 2017 00:23), Max: 101 (04 Nov 2017 20:00)  HR: 72 (05 Nov 2017 00:23) (64 - 78)  BP: 107/70 (05 Nov 2017 00:23) (107/70 - 138/86)  BP(mean): --  RR: 16 (05 Nov 2017 00:23) (16 - 17)  SpO2: 94% (05 Nov 2017 00:23) (94% - 100%)    I&O's Summary    03 Nov 2017 07:01  -  04 Nov 2017 07:00  --------------------------------------------------------  IN: 1590 mL / OUT: 1050 mL / NET: 540 mL    04 Nov 2017 08:01  -  05 Nov 2017 05:45  --------------------------------------------------------  IN: 1810 mL / OUT: 2050 mL / NET: -240 mL    PHYSICAL EXAM:  Neurological: AAOx3, FC, speech coherent  CNII-XII: EOM intact, PERRL, face symmetric, tongue midline, mild L hearing loss (baseline)  Motor: MAEx4 5/5 UE and LE B/L  SILT throughout  Incision/Wound: ACE bandage compressive head dressing in place, C/D/I    TUBES/LINES:  [] [place]  [] Lumbar Drain  [] Wound Drains  [] Others    DIET:  [] NPO  [x] Mechanical  [] Tube feeds    LABS:                        13.5   7.0   )-----------( 160      ( 03 Nov 2017 06:09 )             38.7     PT/INR - ( 03 Nov 2017 06:10 )   PT: 10.1 sec;   INR: 0.91       PTT - ( 03 Nov 2017 06:10 )  PTT:22.9 sec  Urinalysis Basic - ( 04 Nov 2017 22:33 )    Color: Yellow / Appearance: Clear / SG: <=1.005 / pH: x  Gluc: x / Ketone: NEGATIVE  / Bili: Negative / Urobili: 0.2 E.U./dL   Blood: x / Protein: NEGATIVE mg/dL / Nitrite: NEGATIVE   Leuk Esterase: NEGATIVE / RBC: < 5 /HPF / WBC < 5 /HPF   Sq Epi: x / Non Sq Epi: 0-5 /HPF / Bacteria: Present /HPF    CAPILLARY BLOOD GLUCOSE      Drug Levels: [] N/A  Vancomycin Level, Trough: 10.7 ug/mL (11-04 @ 17:19)    CSF Analysis: [] N/A      Allergies    No Known Allergies    Intolerances      MEDICATIONS:  Antibiotics:  vancomycin  IVPB 1000 milliGRAM(s) IV Intermittent every 12 hours    Neuro:  ALPRAZolam 1 milliGRAM(s) Oral two times a day  gabapentin 400 milliGRAM(s) Oral every 8 hours  methocarbamol 750 milliGRAM(s) Oral every 8 hours  ondansetron Injectable 4 milliGRAM(s) IV Push every 6 hours PRN  oxyCODONE    5 mG/acetaminophen 325 mG 1 Tablet(s) Oral every 4 hours PRN  zolpidem 5 milliGRAM(s) Oral at bedtime PRN  zolpidem 5 milliGRAM(s) Oral at bedtime PRN    Anticoagulation:  enoxaparin Injectable 40 milliGRAM(s) SubCutaneous at bedtime    OTHER:  docusate sodium 100 milliGRAM(s) Oral three times a day  metoprolol     tartrate 50 milliGRAM(s) Oral two times a day  nicotine -  14 mG/24Hr(s) Patch 1 patch Transdermal daily  senna 2 Tablet(s) Oral at bedtime PRN    IVF:    CULTURES:    RADIOLOGY & ADDITIONAL TESTS:    ASSESSMENT:  52y Male s/p L acoustic neuroma resection c/b wound leakage    POST OP COMPLICATION  No h/o HF  No pertinent family history in first degree relatives  Handoff  MEWS Score  Anxiety disorder  ADHD (attention deficit hyperactivity disorder)  HTN (hypertension)  Acoustic neuroma  Encephalocele  Encephalocele  Other postoperative complication of skin  Other postoperative complication of skin  Mastoidectomy  Meatoplasty of ear  S/P excision of acoustic neuroma  No significant past surgical history  WOUND CHECK  2    PLAN:  -neuro checks  -pain control  -f/u fever workup  -continue vancomycin  -DVT prophylaxis: SCDs, SQ lovenox  -Bowel regimen  -PT/OT/OOB  -Monitor for wound leakage, c/w compressive head dressing  -F/u labs  -Dispo pending  -D/w Dr. Simms

## 2017-11-06 LAB
ANION GAP SERPL CALC-SCNC: 9 MMOL/L — SIGNIFICANT CHANGE UP (ref 5–17)
BUN SERPL-MCNC: 16 MG/DL — SIGNIFICANT CHANGE UP (ref 7–23)
CALCIUM SERPL-MCNC: 9 MG/DL — SIGNIFICANT CHANGE UP (ref 8.4–10.5)
CHLORIDE SERPL-SCNC: 94 MMOL/L — LOW (ref 96–108)
CO2 SERPL-SCNC: 31 MMOL/L — SIGNIFICANT CHANGE UP (ref 22–31)
CREAT SERPL-MCNC: 0.94 MG/DL — SIGNIFICANT CHANGE UP (ref 0.5–1.3)
CRP SERPL-MCNC: 8.8 MG/DL — HIGH (ref 0–0.4)
ERYTHROCYTE [SEDIMENTATION RATE] IN BLOOD: 23 MM/HR — HIGH
GLUCOSE SERPL-MCNC: 93 MG/DL — SIGNIFICANT CHANGE UP (ref 70–99)
HCT VFR BLD CALC: 37.8 % — LOW (ref 39–50)
HGB BLD-MCNC: 13.4 G/DL — SIGNIFICANT CHANGE UP (ref 13–17)
MCHC RBC-ENTMCNC: 33.3 PG — SIGNIFICANT CHANGE UP (ref 27–34)
MCHC RBC-ENTMCNC: 35.4 G/DL — SIGNIFICANT CHANGE UP (ref 32–36)
MCV RBC AUTO: 94 FL — SIGNIFICANT CHANGE UP (ref 80–100)
OSMOLALITY SERPL: 279 MOSM/KG — LOW (ref 280–301)
OSMOLALITY UR: 583 MOSMOL/KG — SIGNIFICANT CHANGE UP (ref 100–650)
PLATELET # BLD AUTO: 142 K/UL — LOW (ref 150–400)
POTASSIUM SERPL-MCNC: 4.5 MMOL/L — SIGNIFICANT CHANGE UP (ref 3.5–5.3)
POTASSIUM SERPL-SCNC: 4.5 MMOL/L — SIGNIFICANT CHANGE UP (ref 3.5–5.3)
RBC # BLD: 4.02 M/UL — LOW (ref 4.2–5.8)
RBC # FLD: 12.1 % — SIGNIFICANT CHANGE UP (ref 10.3–16.9)
SODIUM SERPL-SCNC: 134 MMOL/L — LOW (ref 135–145)
SODIUM UR-SCNC: 59 MMOL/L — SIGNIFICANT CHANGE UP
WBC # BLD: 7.6 K/UL — SIGNIFICANT CHANGE UP (ref 3.8–10.5)
WBC # FLD AUTO: 7.6 K/UL — SIGNIFICANT CHANGE UP (ref 3.8–10.5)

## 2017-11-06 PROCEDURE — 93970 EXTREMITY STUDY: CPT | Mod: 26

## 2017-11-06 PROCEDURE — 99222 1ST HOSP IP/OBS MODERATE 55: CPT | Mod: GC

## 2017-11-06 RX ORDER — SODIUM CHLORIDE 9 MG/ML
2 INJECTION INTRAMUSCULAR; INTRAVENOUS; SUBCUTANEOUS THREE TIMES A DAY
Qty: 0 | Refills: 0 | Status: DISCONTINUED | OUTPATIENT
Start: 2017-11-06 | End: 2017-11-06

## 2017-11-06 RX ORDER — DEXAMETHASONE 0.5 MG/5ML
4 ELIXIR ORAL EVERY 12 HOURS
Qty: 0 | Refills: 0 | Status: DISCONTINUED | OUTPATIENT
Start: 2017-11-06 | End: 2017-11-07

## 2017-11-06 RX ORDER — INDOMETHACIN 50 MG
25 CAPSULE ORAL THREE TIMES A DAY
Qty: 0 | Refills: 0 | Status: DISCONTINUED | OUTPATIENT
Start: 2017-11-06 | End: 2017-11-08

## 2017-11-06 RX ORDER — PANTOPRAZOLE SODIUM 20 MG/1
40 TABLET, DELAYED RELEASE ORAL
Qty: 0 | Refills: 0 | Status: DISCONTINUED | OUTPATIENT
Start: 2017-11-06 | End: 2017-11-08

## 2017-11-06 RX ORDER — SODIUM CHLORIDE 9 MG/ML
1 INJECTION INTRAMUSCULAR; INTRAVENOUS; SUBCUTANEOUS THREE TIMES A DAY
Qty: 0 | Refills: 0 | Status: DISCONTINUED | OUTPATIENT
Start: 2017-11-06 | End: 2017-11-08

## 2017-11-06 RX ORDER — ACETAMINOPHEN 500 MG
650 TABLET ORAL EVERY 6 HOURS
Qty: 0 | Refills: 0 | Status: DISCONTINUED | OUTPATIENT
Start: 2017-11-06 | End: 2017-11-08

## 2017-11-06 RX ADMIN — METHOCARBAMOL 750 MILLIGRAM(S): 500 TABLET, FILM COATED ORAL at 05:52

## 2017-11-06 RX ADMIN — Medication 100 MILLIGRAM(S): at 14:50

## 2017-11-06 RX ADMIN — HYDROMORPHONE HYDROCHLORIDE 4 MILLIGRAM(S): 2 INJECTION INTRAMUSCULAR; INTRAVENOUS; SUBCUTANEOUS at 18:27

## 2017-11-06 RX ADMIN — ZOLPIDEM TARTRATE 5 MILLIGRAM(S): 10 TABLET ORAL at 23:09

## 2017-11-06 RX ADMIN — HYDROMORPHONE HYDROCHLORIDE 2 MILLIGRAM(S): 2 INJECTION INTRAMUSCULAR; INTRAVENOUS; SUBCUTANEOUS at 10:07

## 2017-11-06 RX ADMIN — SODIUM CHLORIDE 1 GRAM(S): 9 INJECTION INTRAMUSCULAR; INTRAVENOUS; SUBCUTANEOUS at 21:43

## 2017-11-06 RX ADMIN — Medication 25 MILLIGRAM(S): at 22:15

## 2017-11-06 RX ADMIN — HYDROMORPHONE HYDROCHLORIDE 2 MILLIGRAM(S): 2 INJECTION INTRAMUSCULAR; INTRAVENOUS; SUBCUTANEOUS at 09:08

## 2017-11-06 RX ADMIN — GABAPENTIN 400 MILLIGRAM(S): 400 CAPSULE ORAL at 14:57

## 2017-11-06 RX ADMIN — Medication 50 MILLIGRAM(S): at 05:52

## 2017-11-06 RX ADMIN — HYDROMORPHONE HYDROCHLORIDE 4 MILLIGRAM(S): 2 INJECTION INTRAMUSCULAR; INTRAVENOUS; SUBCUTANEOUS at 06:21

## 2017-11-06 RX ADMIN — Medication 25 MILLIGRAM(S): at 16:07

## 2017-11-06 RX ADMIN — Medication 50 MILLIGRAM(S): at 21:43

## 2017-11-06 RX ADMIN — HYDROMORPHONE HYDROCHLORIDE 4 MILLIGRAM(S): 2 INJECTION INTRAMUSCULAR; INTRAVENOUS; SUBCUTANEOUS at 05:51

## 2017-11-06 RX ADMIN — METHOCARBAMOL 750 MILLIGRAM(S): 500 TABLET, FILM COATED ORAL at 21:43

## 2017-11-06 RX ADMIN — Medication 650 MILLIGRAM(S): at 21:43

## 2017-11-06 RX ADMIN — HYDROMORPHONE HYDROCHLORIDE 4 MILLIGRAM(S): 2 INJECTION INTRAMUSCULAR; INTRAVENOUS; SUBCUTANEOUS at 17:56

## 2017-11-06 RX ADMIN — Medication 25 MILLIGRAM(S): at 14:52

## 2017-11-06 RX ADMIN — ENOXAPARIN SODIUM 40 MILLIGRAM(S): 100 INJECTION SUBCUTANEOUS at 21:44

## 2017-11-06 RX ADMIN — HYDROMORPHONE HYDROCHLORIDE 4 MILLIGRAM(S): 2 INJECTION INTRAMUSCULAR; INTRAVENOUS; SUBCUTANEOUS at 23:52

## 2017-11-06 RX ADMIN — Medication 1 MILLIGRAM(S): at 05:53

## 2017-11-06 RX ADMIN — Medication 1 MILLIGRAM(S): at 21:43

## 2017-11-06 RX ADMIN — CYCLOBENZAPRINE HYDROCHLORIDE 5 MILLIGRAM(S): 10 TABLET, FILM COATED ORAL at 09:32

## 2017-11-06 RX ADMIN — Medication 25 MILLIGRAM(S): at 21:43

## 2017-11-06 RX ADMIN — Medication 4 MILLIGRAM(S): at 21:43

## 2017-11-06 RX ADMIN — Medication 100 MILLIGRAM(S): at 21:43

## 2017-11-06 RX ADMIN — HYDROMORPHONE HYDROCHLORIDE 4 MILLIGRAM(S): 2 INJECTION INTRAMUSCULAR; INTRAVENOUS; SUBCUTANEOUS at 11:18

## 2017-11-06 RX ADMIN — METHOCARBAMOL 750 MILLIGRAM(S): 500 TABLET, FILM COATED ORAL at 14:51

## 2017-11-06 RX ADMIN — HYDROMORPHONE HYDROCHLORIDE 4 MILLIGRAM(S): 2 INJECTION INTRAMUSCULAR; INTRAVENOUS; SUBCUTANEOUS at 12:15

## 2017-11-06 RX ADMIN — GABAPENTIN 400 MILLIGRAM(S): 400 CAPSULE ORAL at 05:52

## 2017-11-06 RX ADMIN — SODIUM CHLORIDE 1 GRAM(S): 9 INJECTION INTRAMUSCULAR; INTRAVENOUS; SUBCUTANEOUS at 14:55

## 2017-11-06 RX ADMIN — GABAPENTIN 400 MILLIGRAM(S): 400 CAPSULE ORAL at 21:43

## 2017-11-06 RX ADMIN — Medication 100 MILLIGRAM(S): at 05:52

## 2017-11-06 NOTE — CONSULT NOTE ADULT - ASSESSMENT
51 y/o M PMHx HTN, HLD, Anxiety, ADHD, recent retrosigmoid partial posterior mastoidectomy for L acoustic neuroma (10/27-10/30) presents for complaints of left sided headache and clear wound drainage on 11/2.    #Fevers. Patient has been febrile to 101 during hospitalization, without leukocytosis, mildly elevated ESR, CRP. There was no drainage on admission and area was re-glued and patient received vancomycin x2 days empirically. Per discussion with Neurosurgery attending, aseptic meningitis is common after these procedures.  - agree with stopping antibiotics at this time  - continue to monitor for neurological changes, if acutely worsens would start vancomycin/cefepime    Above discussed with attending and primary team. Please also see attending attestation for further recs below.

## 2017-11-06 NOTE — CONSULT NOTE ADULT - SUBJECTIVE AND OBJECTIVE BOX
Hospital course:  53 y/o M PMHx HTN, HLD, Anxiety, ADHD, recent retrosigmoid partial posterior mastoidectomy for L acoustic neuroma (10/27-10/30) presents for complaints of left sided headache and clear wound drainage on 11/2. Pt is a poor historian due to word finding difficulty and oversedation from pain medications. Pt states initially was without headache. His neurosurgeon stated that he could shower and thus washed his hair; then was told by another physician that he should not have wet the area. Pt states headache was predominantly left sided, then worsened to b/l occipital area, then around his forehead. Before admission, pt denies photophobia, neck stiffness, fevers, chills, nausea/vomiting, abdominal pain, diarrhea, dysuria. In the ED, patient was afebrile, without leukocytosis. Surgical area was no longer draining and was seen by neurosurgery in the ED who had re-glued the area. Patient was started on vancomycin empirically. CT head w/o contrast showed no collections, normal post-op changes. During admission, pt instructed to sleep upright and resulted in neck pain which is possibly related to the b/l occipital area headaches which are now improved. Vancomycin was discontinued yesterday. Further history regarding acoustic neuroma was obtained, fiance at bedside states that around 4/2017, patient was at friend's house, was experiencing vertigo, had multiple episodes of nausea/vomiting, speech was difficult to understand, pt had been saying the same things over and over. He initially had a CT head which was unrevealing eventually had an MRI which showed the acoustic neuroma. Pt previously w/ multiple jobs including real estate. Pt is from [facility] and travels back and forth frequently although his primary residence is in [place]. He traveled to [facility] recently in 8/2017, has a dog there. ID was consulted for concern of infectious etiology and antibiotic recommendations.      PAST MEDICAL & SURGICAL HISTORY:  Anxiety disorder  ADHD (attention deficit hyperactivity disorder)  HTN (hypertension)  Acoustic neuroma  S/P excision of acoustic neuroma        REVIEW OF SYSTEMS:  Negative except for above.       MEDICATIONS  (STANDING):  ALPRAZolam 1 milliGRAM(s) Oral two times a day  dexamethasone     Tablet 4 milliGRAM(s) Oral every 12 hours  docusate sodium 100 milliGRAM(s) Oral three times a day  enoxaparin Injectable 40 milliGRAM(s) SubCutaneous at bedtime  gabapentin 400 milliGRAM(s) Oral every 8 hours  indomethacin 25 milliGRAM(s) Oral three times a day  methocarbamol 750 milliGRAM(s) Oral every 8 hours  metoprolol     tartrate 50 milliGRAM(s) Oral two times a day  nicotine -  14 mG/24Hr(s) Patch 1 patch Transdermal daily  pantoprazole    Tablet 40 milliGRAM(s) Oral before breakfast  sodium chloride 1 Gram(s) Oral three times a day    MEDICATIONS  (PRN):  HYDROmorphone   Tablet 2 milliGRAM(s) Oral every 4 hours PRN Moderate Pain (4 - 6)  HYDROmorphone   Tablet 4 milliGRAM(s) Oral every 4 hours PRN Severe Pain (7 - 10)  ondansetron Injectable 4 milliGRAM(s) IV Push every 6 hours PRN Nausea and/or Vomiting  senna 2 Tablet(s) Oral at bedtime PRN Constipation  zolpidem 5 milliGRAM(s) Oral at bedtime PRN Insomnia  zolpidem 5 milliGRAM(s) Oral at bedtime PRN Insomnia      Allergies    No Known Allergies    Intolerances        Vital Signs Last 24 Hrs  T(C): 38.4 (06 Nov 2017 17:15), Max: 38.4 (06 Nov 2017 17:15)  T(F): 101.1 (06 Nov 2017 17:15), Max: 101.1 (06 Nov 2017 17:15)  HR: 81 (06 Nov 2017 17:15) (77 - 84)  BP: 117/73 (06 Nov 2017 17:15) (98/66 - 120/80)  BP(mean): --  RR: 77 (06 Nov 2017 17:15) (16 - 77)  SpO2: 97% (06 Nov 2017 17:15) (95% - 99%)    PHYSICAL EXAM:  Constitutional: Odd affect, well-developed, well-nourished, NAD. + word finding difficulty, falls asleep during interview  Head: left surgical site stitches in place, sealed with surgical glue, area is soft, no fluctuance; small amount of yellow fluid on gauze; mild tenderness to palpation  EENT: PERRL, EOMI, conjunctiva clear, no oral lesions  Neck: no JVD, no lymphadenopathy, supple- able to perform head to chin  Respiratory: CTAb/l, no wheezes/rales/rhonchi  Cardiovascular: RRR with no murmurs  Gastrointestinal: soft, NTND, BS normal  Extremities: no edema or cyanosis   Vascular: 2+ DP pulses b/l  Neuro: CNII-XII grossly intact    LABS                        13.4   7.6   )-----------( 142      ( 06 Nov 2017 10:45 )             37.8     11-06    134<L>  |  94<L>  |  16  ----------------------------<  93  4.5   |  31  |  0.94    Ca    9.0      06 Nov 2017 10:45    TPro  6.4  /  Alb  3.6  /  TBili  1.0  /  DBili  x   /  AST  19  /  ALT  35  /  AlkPhos  33<L>  11-05      Urinalysis Basic - ( 04 Nov 2017 22:33 )    Color: Yellow / Appearance: Clear / SG: <=1.005 / pH: x  Gluc: x / Ketone: NEGATIVE  / Bili: Negative / Urobili: 0.2 E.U./dL   Blood: x / Protein: NEGATIVE mg/dL / Nitrite: NEGATIVE   Leuk Esterase: NEGATIVE / RBC: < 5 /HPF / WBC < 5 /HPF   Sq Epi: x / Non Sq Epi: 0-5 /HPF / Bacteria: Present /HPF        MICROBIOLOGY:  11/5 Blood cultures- NGTD  11/4 Surgical site cultures- no afb    RADIOLOGY & ADDITIONAL STUDIES: Hospital course:  51 y/o M PMHx HTN, HLD, Anxiety, ADHD, recent retrosigmoid partial posterior mastoidectomy for L acoustic neuroma (10/27-10/30) presents for complaints of left sided headache and clear wound drainage on 11/2. Pt is a poor historian due to word finding difficulty and oversedation from pain medications. Pt states initially was without headache. His neurosurgeon stated that he could shower and thus washed his hair; then was told by another physician that he should not have wet the area. Pt states headache was predominantly left sided, then worsened to b/l occipital area, then around his forehead. Before admission, pt denies photophobia, neck stiffness, fevers, chills, nausea/vomiting, abdominal pain, diarrhea, dysuria. In the ED, patient was afebrile, without leukocytosis. Surgical area was no longer draining and was seen by neurosurgery in the ED who had re-glued the area. Patient was started on vancomycin empirically. CT head w/o contrast showed no collections, normal post-op changes. During admission, pt instructed to sleep upright and resulted in neck pain which is possibly related to the b/l occipital area headaches which are now improved. Vancomycin was discontinued yesterday. Further history regarding acoustic neuroma was obtained, fiance at bedside states that around 4/2017, patient was at friend's house, was experiencing vertigo, had multiple episodes of nausea/vomiting, speech was difficult to understand, pt had been saying the same things over and over. He initially had a CT head which was unrevealing eventually had an MRI which showed the acoustic neuroma. Pt previously w/ multiple jobs including real estate. Pt is from [facility] and travels back and forth frequently although his primary residence is in [place]. He traveled to [facility] recently in 8/2017, has a dog there. ID was consulted for concern of infectious etiology and antibiotic recommendations.      PAST MEDICAL & SURGICAL HISTORY:  Anxiety disorder  ADHD (attention deficit hyperactivity disorder)  HTN (hypertension)  Acoustic neuroma  S/P excision of acoustic neuroma        REVIEW OF SYSTEMS:  Negative except for above.       MEDICATIONS  (STANDING):  ALPRAZolam 1 milliGRAM(s) Oral two times a day  dexamethasone     Tablet 4 milliGRAM(s) Oral every 12 hours  docusate sodium 100 milliGRAM(s) Oral three times a day  enoxaparin Injectable 40 milliGRAM(s) SubCutaneous at bedtime  gabapentin 400 milliGRAM(s) Oral every 8 hours  indomethacin 25 milliGRAM(s) Oral three times a day  methocarbamol 750 milliGRAM(s) Oral every 8 hours  metoprolol     tartrate 50 milliGRAM(s) Oral two times a day  nicotine -  14 mG/24Hr(s) Patch 1 patch Transdermal daily  pantoprazole    Tablet 40 milliGRAM(s) Oral before breakfast  sodium chloride 1 Gram(s) Oral three times a day    MEDICATIONS  (PRN):  HYDROmorphone   Tablet 2 milliGRAM(s) Oral every 4 hours PRN Moderate Pain (4 - 6)  HYDROmorphone   Tablet 4 milliGRAM(s) Oral every 4 hours PRN Severe Pain (7 - 10)  ondansetron Injectable 4 milliGRAM(s) IV Push every 6 hours PRN Nausea and/or Vomiting  senna 2 Tablet(s) Oral at bedtime PRN Constipation  zolpidem 5 milliGRAM(s) Oral at bedtime PRN Insomnia  zolpidem 5 milliGRAM(s) Oral at bedtime PRN Insomnia      Allergies    No Known Allergies    Intolerances        Vital Signs Last 24 Hrs  T(C): 38.4 (06 Nov 2017 17:15), Max: 38.4 (06 Nov 2017 17:15)  T(F): 101.1 (06 Nov 2017 17:15), Max: 101.1 (06 Nov 2017 17:15)  HR: 81 (06 Nov 2017 17:15) (77 - 84)  BP: 117/73 (06 Nov 2017 17:15) (98/66 - 120/80)  BP(mean): --  RR: 77 (06 Nov 2017 17:15) (16 - 77)  SpO2: 97% (06 Nov 2017 17:15) (95% - 99%)    PHYSICAL EXAM:  Constitutional: Odd affect, well-developed, well-nourished, NAD. + word finding difficulty, falls asleep during interview  Head: left surgical site stitches in place, sealed with surgical glue, area is soft, no fluctuance; small amount of yellow fluid on gauze; mild tenderness to palpation  EENT: PERRL, EOMI, conjunctiva clear, no oral lesions  Neck: no JVD, no lymphadenopathy, supple- able to perform head to chin  Respiratory: CTAb/l, no wheezes/rales/rhonchi  Cardiovascular: RRR with no murmurs  Gastrointestinal: soft, NTND, BS normal  Extremities: no edema or cyanosis   Vascular: 2+ DP pulses b/l  Neuro: CNII-XII grossly intact    LABS                        13.4   7.6   )-----------( 142      ( 06 Nov 2017 10:45 )             37.8     11-06    134<L>  |  94<L>  |  16  ----------------------------<  93  4.5   |  31  |  0.94    Ca    9.0      06 Nov 2017 10:45    TPro  6.4  /  Alb  3.6  /  TBili  1.0  /  DBili  x   /  AST  19  /  ALT  35  /  AlkPhos  33<L>  11-05      Urinalysis Basic - ( 04 Nov 2017 22:33 )    Color: Yellow / Appearance: Clear / SG: <=1.005 / pH: x  Gluc: x / Ketone: NEGATIVE  / Bili: Negative / Urobili: 0.2 E.U./dL   Blood: x / Protein: NEGATIVE mg/dL / Nitrite: NEGATIVE   Leuk Esterase: NEGATIVE / RBC: < 5 /HPF / WBC < 5 /HPF   Sq Epi: x / Non Sq Epi: 0-5 /HPF / Bacteria: Present /HPF        MICROBIOLOGY:  11/5 Blood cultures- NGTD  11/4 Surgical site cultures- no afb    RADIOLOGY & ADDITIONAL STUDIES:  < from: CT Head No Cont (11.03.17 @ 02:35) >  IMPRESSION:  No hydrocephalus, midline shift, acute intracranial hemorrhage or   demarcated territorial infarct.    Recent left lateral occipital craniotomy with partial posterior   mastoidectomy for excision of a left IAC neuroma.    < end of copied text >

## 2017-11-06 NOTE — PROGRESS NOTE ADULT - SUBJECTIVE AND OBJECTIVE BOX
HPI:  Pt is 51yo male, well know to [facility] Neurosurgery service s/p L acoustic neuroma resection 10/27 by Dr. Simms and Dr. Nova, discharged home 10/30/2017, presents to the ED [facility] with c/o drainage from the incision site, which started 11/2/2017.  Pt denies fevers/chills, neck pain, photophobia, n/v, headache, sob, cp. (03 Nov 2017 03:06)    OVERNIGHT EVENTS:  Vital Signs Last 24 Hrs  T(C): 37.7 (06 Nov 2017 08:31), Max: 38.3 (05 Nov 2017 14:07)  T(F): 99.9 (06 Nov 2017 08:31), Max: 101 (05 Nov 2017 14:07)  HR: 81 (06 Nov 2017 08:31) (77 - 86)  BP: 117/77 (06 Nov 2017 08:31) (102/73 - 126/82)  BP(mean): --  RR: 17 (06 Nov 2017 08:31) (16 - 17)  SpO2: 97% (06 Nov 2017 08:31) (82% - 97%)    I&O's Summary    05 Nov 2017 07:01  -  06 Nov 2017 07:00  --------------------------------------------------------  IN: 980 mL / OUT: 2650 mL / NET: -1670 mL    06 Nov 2017 07:01  -  06 Nov 2017 09:43  --------------------------------------------------------  IN: 300 mL / OUT: 0 mL / NET: 300 mL        PHYSICAL EXAM:  Neurological:   Neurological: AAOx3, FC, speech coherent  CNII-XII: EOM intact, PERRL, face symmetric, tongue midline, mild L hearing loss (baseline)  Motor: MAEx4 5/5 UE and LE B/L  Motor exam:         [] Upper extremity              Bi(c5)  WE(c6)  EE(c7)   FF(c8)                                                R         5/5        5/5        5/5       5/5                                               L          5/5        5/5        5/5       5/5         [] Lower extremeity          HF(l2)   KE(l3)    TA(l4)   EHL(l5)  GS(s1)                                                 R        5/5        5/5        5/5       5/5         5/5                                               L         5/5        5/5       5/5       5/5          5/5                                                        [] warm well perfused; capillary refill <3 seconds     Sensation: [] intact to light touch  [] decreased:       Cardiovascular:  Respiratory:  Gastrointestinal:  Genitourinary:  Extremities:  Incision/Wound:    TUBES/LINES:  [] [place]  [] Lumbar Drain  [] Wound Drains  [] Others      DIET:  [] NPO  [] Mechanical  [] Tube feeds    LABS:                        13.7   8.0   )-----------( 153      ( 05 Nov 2017 07:03 )             39.6     11-05    132<L>  |  94<L>  |  15  ----------------------------<  111<H>  4.5   |  29  |  1.05    Ca    9.1      05 Nov 2017 07:03    TPro  6.4  /  Alb  3.6  /  TBili  1.0  /  DBili  x   /  AST  19  /  ALT  35  /  AlkPhos  33<L>  11-05      Urinalysis Basic - ( 04 Nov 2017 22:33 )    Color: Yellow / Appearance: Clear / SG: <=1.005 / pH: x  Gluc: x / Ketone: NEGATIVE  / Bili: Negative / Urobili: 0.2 E.U./dL   Blood: x / Protein: NEGATIVE mg/dL / Nitrite: NEGATIVE   Leuk Esterase: NEGATIVE / RBC: < 5 /HPF / WBC < 5 /HPF   Sq Epi: x / Non Sq Epi: 0-5 /HPF / Bacteria: Present /HPF          CAPILLARY BLOOD GLUCOSE          Drug Levels: [] N/A  Vancomycin Level, Trough: 10.7 ug/mL (11-04 @ 17:19)    CSF Analysis: [] N/A      Allergies    No Known Allergies    Intolerances      MEDICATIONS:  Antibiotics:    Neuro:  ALPRAZolam 1 milliGRAM(s) Oral two times a day  cyclobenzaprine 5 milliGRAM(s) Oral three times a day PRN  gabapentin 400 milliGRAM(s) Oral every 8 hours  HYDROmorphone   Tablet 2 milliGRAM(s) Oral every 4 hours PRN  HYDROmorphone   Tablet 4 milliGRAM(s) Oral every 4 hours PRN  methocarbamol 750 milliGRAM(s) Oral every 8 hours  ondansetron Injectable 4 milliGRAM(s) IV Push every 6 hours PRN  zolpidem 5 milliGRAM(s) Oral at bedtime PRN  zolpidem 5 milliGRAM(s) Oral at bedtime PRN    Anticoagulation:  enoxaparin Injectable 40 milliGRAM(s) SubCutaneous at bedtime    OTHER:  docusate sodium 100 milliGRAM(s) Oral three times a day  metoprolol     tartrate 50 milliGRAM(s) Oral two times a day  nicotine -  14 mG/24Hr(s) Patch 1 patch Transdermal daily  senna 2 Tablet(s) Oral at bedtime PRN    IVF:    CULTURES:  Culture Results:   No growth at 1 day. (11-05 @ 00:22)    RADIOLOGY & ADDITIONAL TESTS:      ASSESSMENT:  52y Male s/p L SOC  resection acoustic neuroma  re admitted wound draiange  s/p dermobond placed    PLAN:    NEURO:    Monitor neuro status  Ot/PT  Pain Management  Bowel regime  Continue current medical regime    DispO; Discussed with attending

## 2017-11-06 NOTE — PROGRESS NOTE ADULT - SUBJECTIVE AND OBJECTIVE BOX
NEUROSURGERY PAIN MANAGEMENT PROGRESS NOTE    OVERNIGHT EVENTS:  - Pt spiked fever overnight. ID to follow. Pt c/o worsening headaches since admission, with limited benefit from pain control. Pt reporting     PLAN/RECOMMENDATIONS:  - C/w current dosing, will verify with home psychiatrist the safety of discharge home with pain medications given pt has hx of addictive personality, despite no hx of addiction to pain medications.   - Dc Flexeril, c/w Robaxin  - Dc Motrin, c/w Indomethacin & Decadron--pt on PPI for ulcer prophylaxis  - C/w Dilaudid, Gabapentin, Robaxin dosing.    HPI:  Pt is 53yo male, well know to [facility] Neurosurgery service s/p L acoustic neuroma resection 10/27 by Dr. Simms and Dr. Nova, discharged home 10/30/2017, presents to the ED [facility] with c/o drainage from the incision site, which started 11/2/2017.  Pt denies fevers/chills, neck pain, photophobia, n/v, headache, sob, cp. (03 Nov 2017 03:06)      PAST MEDICAL & SURGICAL HISTORY:  Anxiety disorder  ADHD (attention deficit hyperactivity disorder)  HTN (hypertension)  Acoustic neuroma  S/P excision of acoustic neuroma      FAMILY HISTORY:  No pertinent family history in first degree relatives      Allergies    No Known Allergies    Intolerances        PAIN MEDICATIONS:  ALPRAZolam 1 milliGRAM(s) Oral two times a day  cyclobenzaprine 5 milliGRAM(s) Oral three times a day PRN  gabapentin 400 milliGRAM(s) Oral every 8 hours  HYDROmorphone   Tablet 2 milliGRAM(s) Oral every 4 hours PRN  HYDROmorphone   Tablet 4 milliGRAM(s) Oral every 4 hours PRN  indomethacin 25 milliGRAM(s) Oral three times a day  methocarbamol 750 milliGRAM(s) Oral every 8 hours  ondansetron Injectable 4 milliGRAM(s) IV Push every 6 hours PRN  zolpidem 5 milliGRAM(s) Oral at bedtime PRN  zolpidem 5 milliGRAM(s) Oral at bedtime PRN    Heme:  enoxaparin Injectable 40 milliGRAM(s) SubCutaneous at bedtime    Antibiotics:    Cardiovascular:  metoprolol     tartrate 50 milliGRAM(s) Oral two times a day    GI:  docusate sodium 100 milliGRAM(s) Oral three times a day  pantoprazole    Tablet 40 milliGRAM(s) Oral before breakfast  senna 2 Tablet(s) Oral at bedtime PRN    Endocrine:  dexamethasone     Tablet 4 milliGRAM(s) Oral every 12 hours    All Other Medications:  nicotine -  14 mG/24Hr(s) Patch 1 patch Transdermal daily  sodium chloride 1 Gram(s) Oral three times a day      Vital Signs Last 24 Hrs  T(C): 37.7 (06 Nov 2017 08:31), Max: 38.3 (05 Nov 2017 14:07)  T(F): 99.9 (06 Nov 2017 08:31), Max: 101 (05 Nov 2017 14:07)  HR: 81 (06 Nov 2017 08:31) (77 - 86)  BP: 117/77 (06 Nov 2017 08:31) (102/73 - 126/82)  BP(mean): --  RR: 17 (06 Nov 2017 08:31) (16 - 17)  SpO2: 97% (06 Nov 2017 08:31) (82% - 97%)    LABS:                        13.4   7.6   )-----------( 142      ( 06 Nov 2017 10:45 )             37.8     11-06    134<L>  |  94<L>  |  16  ----------------------------<  93  4.5   |  31  |  0.94    Ca    9.0      06 Nov 2017 10:45    TPro  6.4  /  Alb  3.6  /  TBili  1.0  /  DBili  x   /  AST  19  /  ALT  35  /  AlkPhos  33<L>  11-05      Urinalysis Basic - ( 04 Nov 2017 22:33 )    Color: Yellow / Appearance: Clear / SG: <=1.005 / pH: x  Gluc: x / Ketone: NEGATIVE  / Bili: Negative / Urobili: 0.2 E.U./dL   Blood: x / Protein: NEGATIVE mg/dL / Nitrite: NEGATIVE   Leuk Esterase: NEGATIVE / RBC: < 5 /HPF / WBC < 5 /HPF   Sq Epi: x / Non Sq Epi: 0-5 /HPF / Bacteria: Present /HPF    REVIEW OF SYSTEMS:  CONSTITUTIONAL: Spiked fever overnight.   NECK: + Stiffness and associated pain.   RESPIRATORY: No cough, wheezing; No shortness of breath  CARDIOVASCULAR: No chest pain, palpitations.   GASTROINTESTINAL: Pt reports passing gas. BM frequent. No abdominal or epigastric pain. No nausea, vomiting.  NEUROLOGICAL: Significant generalized headache now, throbbing quality. Denies loss of strength, facial numbness. No dizziness or lightheadedness with pain medications.      FUNCTIONAL ASSESSMENT:  PAIN SCORE AT REST:     9/10    SCALE USED: (1-10 VAS)  PAIN SCORE WITH ACTIVITY:   9/10      SCALE USED: (1-10 VAS)    PAIN ASSESSMENT:  Pt continues to complain of headache, previously on Friday, was stating that headache was L sided, now is generalized and muscle spasms are in b/l trap. Pain is continuous and throbbing, and deep, was previously reporting incisional pain.     PHYSICAL EXAM  GENERAL: NAD  NERVOUS SYSTEM:    Alert & Oriented X3, Good concentration;   Cranial nerves grossly intact  Motor exam:  NUNO x 4, 5/5 in 4 extr.   CHEST/LUNG: Clear to auscultation bilaterally; No rales, rhonchi, wheezing, or rubs  HEART: Regular rate and rhythm; No murmurs, rubs, or gallops  ABDOMEN: Tight, nontender. Bowel sounds present  SKIN: No rashes or lesions. Incision covered by headwrap.      ASSESSMENT:   53yo M w/ c/o drainage from incision site s/p acoustic neuroma resection 10/27    PLAN:   1. Opioids  Immediately on admission, pt was on Tramadol. Verified hx of substance abuse, per PCP, pt does not have hx of additction to pain medications or substances. Left message with Psychiatrist. Pt has required frequent doses of Dilaudid PO, but is stating he is getting adequate relief. Concern for infection, given spiking fevers, hyponatremia.     PLAN: C/w Dilaudid for now, will await final recs once speaking with Psychiatrist. Pt apparently does not have history of addiction but has a history of addictive personality and compulsive tendency. Will assess risk given concurrent dosing of Benzos at home.     2. Neuropathics  Pt currently denies neuropathic pain. No numbness/tingling/electric shock like sensation in LE/UE b.l.  PLAN: C/w Gabapentin 400mg TID for opioid sparing effect. Pt also has anxiety, gabapentin will provide anxiolyytic benefit      3. Adjuvants  Pt continues to complain of L&R sided muscle spasms/cramping in neck which extends rom neck into L trap.   PLAN: C/w Robaxin 750mg TID. Dcd Motrin 600mg TID. Decadron & Indomethacin started per Dr. Simms.    4. Prophylactic:   Bowel regimen: Docusate Senna;    Nausea PRN: Zofran PRN for Nausea, pt does not c/o current    5. Functional Goals:   Pt will get OOB with PT today. Pt will resume previous level of activity without impairment from surgery.     6. Additional Consults:   None recommended.     7. Additional Labs/Imaging:   None recommended.     8. Follow up, Discharge Planning:   Patient is set for discharge to: Home  Discharge is pending: Fever workup.  Pain Management follow up plan: F/u inpatient, re refer outpatient.

## 2017-11-06 NOTE — CONSULT NOTE ADULT - ATTENDING COMMENTS
I have reviewed the medical record, including laboratory and radiographic studies, interviewed and examined the patient and discussed the plan with Dr. Oliveira, the ID Resident.  Agree with above.  Would follow off antibiotics.  If okay tomorrow, can consider d/c with instructions to call if worsens.  Discussed with Dr. Simms and housestaff of primary team.  Will continue to follow with you – ID service.

## 2017-11-07 ENCOUNTER — TRANSCRIPTION ENCOUNTER (OUTPATIENT)
Age: 52
End: 2017-11-07

## 2017-11-07 LAB
ANION GAP SERPL CALC-SCNC: 12 MMOL/L — SIGNIFICANT CHANGE UP (ref 5–17)
BUN SERPL-MCNC: 17 MG/DL — SIGNIFICANT CHANGE UP (ref 7–23)
CALCIUM SERPL-MCNC: 9.7 MG/DL — SIGNIFICANT CHANGE UP (ref 8.4–10.5)
CHLORIDE SERPL-SCNC: 96 MMOL/L — SIGNIFICANT CHANGE UP (ref 96–108)
CO2 SERPL-SCNC: 28 MMOL/L — SIGNIFICANT CHANGE UP (ref 22–31)
CREAT SERPL-MCNC: 0.86 MG/DL — SIGNIFICANT CHANGE UP (ref 0.5–1.3)
GLUCOSE SERPL-MCNC: 150 MG/DL — HIGH (ref 70–99)
HCT VFR BLD CALC: 40.5 % — SIGNIFICANT CHANGE UP (ref 39–50)
HGB BLD-MCNC: 14 G/DL — SIGNIFICANT CHANGE UP (ref 13–17)
MCHC RBC-ENTMCNC: 32.3 PG — SIGNIFICANT CHANGE UP (ref 27–34)
MCHC RBC-ENTMCNC: 34.6 G/DL — SIGNIFICANT CHANGE UP (ref 32–36)
MCV RBC AUTO: 93.3 FL — SIGNIFICANT CHANGE UP (ref 80–100)
PLATELET # BLD AUTO: 186 K/UL — SIGNIFICANT CHANGE UP (ref 150–400)
POTASSIUM SERPL-MCNC: 5 MMOL/L — SIGNIFICANT CHANGE UP (ref 3.5–5.3)
POTASSIUM SERPL-SCNC: 5 MMOL/L — SIGNIFICANT CHANGE UP (ref 3.5–5.3)
RBC # BLD: 4.34 M/UL — SIGNIFICANT CHANGE UP (ref 4.2–5.8)
RBC # FLD: 11.7 % — SIGNIFICANT CHANGE UP (ref 10.3–16.9)
SODIUM SERPL-SCNC: 136 MMOL/L — SIGNIFICANT CHANGE UP (ref 135–145)
WBC # BLD: 10.5 K/UL — SIGNIFICANT CHANGE UP (ref 3.8–10.5)
WBC # FLD AUTO: 10.5 K/UL — SIGNIFICANT CHANGE UP (ref 3.8–10.5)

## 2017-11-07 PROCEDURE — 99232 SBSQ HOSP IP/OBS MODERATE 35: CPT

## 2017-11-07 RX ORDER — DEXAMETHASONE 0.5 MG/5ML
4 ELIXIR ORAL
Qty: 0 | Refills: 0 | Status: DISCONTINUED | OUTPATIENT
Start: 2017-11-07 | End: 2017-11-08

## 2017-11-07 RX ORDER — DEXAMETHASONE 0.5 MG/5ML
4 ELIXIR ORAL EVERY 6 HOURS
Qty: 0 | Refills: 0 | Status: DISCONTINUED | OUTPATIENT
Start: 2017-11-07 | End: 2017-11-07

## 2017-11-07 RX ORDER — METHOCARBAMOL 500 MG/1
500 TABLET, FILM COATED ORAL EVERY 8 HOURS
Qty: 0 | Refills: 0 | Status: DISCONTINUED | OUTPATIENT
Start: 2017-11-07 | End: 2017-11-08

## 2017-11-07 RX ORDER — DIPHENHYDRAMINE HCL 50 MG
25 CAPSULE ORAL ONCE
Qty: 0 | Refills: 0 | Status: COMPLETED | OUTPATIENT
Start: 2017-11-07 | End: 2017-11-07

## 2017-11-07 RX ORDER — LORATADINE 10 MG/1
10 TABLET ORAL ONCE
Qty: 0 | Refills: 0 | Status: COMPLETED | OUTPATIENT
Start: 2017-11-07 | End: 2017-11-07

## 2017-11-07 RX ADMIN — SODIUM CHLORIDE 1 GRAM(S): 9 INJECTION INTRAMUSCULAR; INTRAVENOUS; SUBCUTANEOUS at 21:32

## 2017-11-07 RX ADMIN — Medication 100 MILLIGRAM(S): at 07:01

## 2017-11-07 RX ADMIN — Medication 4 MILLIGRAM(S): at 18:11

## 2017-11-07 RX ADMIN — METHOCARBAMOL 500 MILLIGRAM(S): 500 TABLET, FILM COATED ORAL at 13:47

## 2017-11-07 RX ADMIN — ZOLPIDEM TARTRATE 5 MILLIGRAM(S): 10 TABLET ORAL at 01:47

## 2017-11-07 RX ADMIN — HYDROMORPHONE HYDROCHLORIDE 4 MILLIGRAM(S): 2 INJECTION INTRAMUSCULAR; INTRAVENOUS; SUBCUTANEOUS at 16:53

## 2017-11-07 RX ADMIN — Medication 1 PATCH: at 12:33

## 2017-11-07 RX ADMIN — Medication 50 MILLIGRAM(S): at 07:02

## 2017-11-07 RX ADMIN — Medication 25 MILLIGRAM(S): at 21:33

## 2017-11-07 RX ADMIN — Medication 50 MILLIGRAM(S): at 18:11

## 2017-11-07 RX ADMIN — Medication 1 MILLIGRAM(S): at 07:01

## 2017-11-07 RX ADMIN — GABAPENTIN 400 MILLIGRAM(S): 400 CAPSULE ORAL at 07:01

## 2017-11-07 RX ADMIN — SODIUM CHLORIDE 1 GRAM(S): 9 INJECTION INTRAMUSCULAR; INTRAVENOUS; SUBCUTANEOUS at 13:51

## 2017-11-07 RX ADMIN — Medication 25 MILLIGRAM(S): at 22:33

## 2017-11-07 RX ADMIN — Medication 25 MILLIGRAM(S): at 07:30

## 2017-11-07 RX ADMIN — ENOXAPARIN SODIUM 40 MILLIGRAM(S): 100 INJECTION SUBCUTANEOUS at 21:33

## 2017-11-07 RX ADMIN — Medication 100 MILLIGRAM(S): at 13:47

## 2017-11-07 RX ADMIN — Medication 100 MILLIGRAM(S): at 21:33

## 2017-11-07 RX ADMIN — METHOCARBAMOL 500 MILLIGRAM(S): 500 TABLET, FILM COATED ORAL at 21:36

## 2017-11-07 RX ADMIN — Medication 25 MILLIGRAM(S): at 07:02

## 2017-11-07 RX ADMIN — LORATADINE 10 MILLIGRAM(S): 10 TABLET ORAL at 22:03

## 2017-11-07 RX ADMIN — PANTOPRAZOLE SODIUM 40 MILLIGRAM(S): 20 TABLET, DELAYED RELEASE ORAL at 08:03

## 2017-11-07 RX ADMIN — HYDROMORPHONE HYDROCHLORIDE 4 MILLIGRAM(S): 2 INJECTION INTRAMUSCULAR; INTRAVENOUS; SUBCUTANEOUS at 01:15

## 2017-11-07 RX ADMIN — HYDROMORPHONE HYDROCHLORIDE 4 MILLIGRAM(S): 2 INJECTION INTRAMUSCULAR; INTRAVENOUS; SUBCUTANEOUS at 12:40

## 2017-11-07 RX ADMIN — Medication 25 MILLIGRAM(S): at 14:47

## 2017-11-07 RX ADMIN — HYDROMORPHONE HYDROCHLORIDE 4 MILLIGRAM(S): 2 INJECTION INTRAMUSCULAR; INTRAVENOUS; SUBCUTANEOUS at 13:40

## 2017-11-07 RX ADMIN — HYDROMORPHONE HYDROCHLORIDE 4 MILLIGRAM(S): 2 INJECTION INTRAMUSCULAR; INTRAVENOUS; SUBCUTANEOUS at 08:03

## 2017-11-07 RX ADMIN — HYDROMORPHONE HYDROCHLORIDE 4 MILLIGRAM(S): 2 INJECTION INTRAMUSCULAR; INTRAVENOUS; SUBCUTANEOUS at 08:30

## 2017-11-07 RX ADMIN — ZOLPIDEM TARTRATE 5 MILLIGRAM(S): 10 TABLET ORAL at 22:04

## 2017-11-07 RX ADMIN — Medication 25 MILLIGRAM(S): at 20:01

## 2017-11-07 RX ADMIN — Medication 1 MILLIGRAM(S): at 18:11

## 2017-11-07 RX ADMIN — SODIUM CHLORIDE 1 GRAM(S): 9 INJECTION INTRAMUSCULAR; INTRAVENOUS; SUBCUTANEOUS at 07:01

## 2017-11-07 RX ADMIN — Medication 25 MILLIGRAM(S): at 13:47

## 2017-11-07 RX ADMIN — METHOCARBAMOL 750 MILLIGRAM(S): 500 TABLET, FILM COATED ORAL at 07:01

## 2017-11-07 NOTE — SPEECH LANGUAGE PATHOLOGY EVALUATION - COMMENTS
51 yo M w h/o anxiety disorder, ADHD, and HTN, s/p L acoustic neuroma resection 10/27 by Dr. Simms and Dr. Nova, discharged home 10/30/2017, presented to the ED with c/o drainage from the incision site, which started 11/2/2017. 1) Pt would benefit from speech-language therapy to target higher level expressive language and cognitive skills  2) This C will f/u Pt and wife c/o word-finding difficulties. 1) Pt would benefit from speech-language therapy to target higher level expressive language and cognitive skills  2) This [facility] will f/u    Short-term goals:  1) Pt will use circumlocution strategies for word-finding difficulties during conversation in 60% of opportunities.  2)Pt will demonstrate 60% accuracy w mental manipulation tasks given maximal prompting.  3) Pt will recall 3 item lists w 80% accuracy given distraction for 1-min duration w visualization/repetition strategies. Pt exhibited difficulty w mental manipulation tasks such as simple mental math word problems, spelling backwards and memory of 3-item lists given distractions. Per pt and wife, this is a change for him that the pt has noticed too.

## 2017-11-07 NOTE — PROGRESS NOTE ADULT - ASSESSMENT
51 y/o M PMHx HTN, HLD, Anxiety, ADHD, recent retrosigmoid partial posterior mastoidectomy for L acoustic neuroma (10/27-10/30) presents for complaints of left sided headache and clear wound drainage on 11/2.  He is symptomatically improved on steroids for possible asypetic meningitis, though agree that fever last night merits additional observation.  Suggest:  - If he is again febrile, please send blood cultures X 2 sets  Will continue to follow with you - ID service

## 2017-11-07 NOTE — DIETITIAN INITIAL EVALUATION ADULT. - OTHER INFO
51yo M w/ recent admission for L acoustic neuroma resection 10/27, now readmitted w/ drainage from incision site, s/p dermobond placed. Currently on regular diet and tolerating well. Per fiance, pt endorses good appetite and is consuming 100% meals. Denies GI distress. Complained of previous headaches, pain being managed. NKFA or dietary restrictions. No significant wt changes. Skin: surgical incision w/ sutures in place, GI WDL per flowsheet.

## 2017-11-07 NOTE — PHYSICAL THERAPY INITIAL EVALUATION ADULT - ADDITIONAL COMMENTS
8 steps to negotiate at home.    CNII-XII intact. Tongue midline, face symmetrical. Vision intact - tracts with smooth pursuits. 8 steps to negotiate at home.    CNII-XII intact. Tongue midline, face symmetrical. Vision intact - tracks with smooth pursuits.

## 2017-11-07 NOTE — PROGRESS NOTE ADULT - SUBJECTIVE AND OBJECTIVE BOX
NEUROSURGERY PAIN MANAGEMENT PROGRESS NOTE    OVERNIGHT EVENTS:  - Cont to spike fevers since o/n: T(F): 98.7 (07 Nov 2017 09:00), Max: 101.2 (06 Nov 2017 20:55)  - Plan for dc tomorrow    PLAN/RECOMMENDATIONS:  - Dc with short of course of Dilaudid PO & Robaxin  - Dcd Gabapentin d/t somnolence with co-admin in AM yesterday, no SE with coadmin with Dilaudid     HPI:  Pt is 51yo male, well know to [facility] Neurosurgery service s/p L acoustic neuroma resection 10/27 by Dr. Simms and Dr. Nova, discharged home 10/30/2017, presents to the ED [facility] with c/o drainage from the incision site, which started 11/2/2017.  Pt denies fevers/chills, neck pain, photophobia, n/v, headache, sob, cp. (03 Nov 2017 03:06)      PAST MEDICAL & SURGICAL HISTORY:  Anxiety disorder  ADHD (attention deficit hyperactivity disorder)  HTN (hypertension)  Acoustic neuroma  S/P excision of acoustic neuroma      FAMILY HISTORY:  No pertinent family history in first degree relatives      Allergies    No Known Allergies    Intolerances        PAIN MEDICATIONS:  acetaminophen   Tablet 650 milliGRAM(s) Oral every 6 hours PRN  ALPRAZolam 1 milliGRAM(s) Oral two times a day  HYDROmorphone   Tablet 2 milliGRAM(s) Oral every 4 hours PRN  HYDROmorphone   Tablet 4 milliGRAM(s) Oral every 4 hours PRN  indomethacin 25 milliGRAM(s) Oral three times a day  methocarbamol 500 milliGRAM(s) Oral every 8 hours  ondansetron Injectable 4 milliGRAM(s) IV Push every 6 hours PRN  zolpidem 5 milliGRAM(s) Oral at bedtime PRN  zolpidem 5 milliGRAM(s) Oral at bedtime PRN    Heme:  enoxaparin Injectable 40 milliGRAM(s) SubCutaneous at bedtime    Antibiotics:    Cardiovascular:  metoprolol     tartrate 50 milliGRAM(s) Oral two times a day    GI:  docusate sodium 100 milliGRAM(s) Oral three times a day  pantoprazole    Tablet 40 milliGRAM(s) Oral before breakfast  senna 2 Tablet(s) Oral at bedtime PRN    Endocrine:  dexamethasone     Tablet 4 milliGRAM(s) Oral two times a day    All Other Medications:  nicotine -  14 mG/24Hr(s) Patch 1 patch Transdermal daily  sodium chloride 1 Gram(s) Oral three times a day      Vital Signs Last 24 Hrs  T(C): 37.1 (07 Nov 2017 09:00), Max: 38.4 (06 Nov 2017 17:15)  T(F): 98.7 (07 Nov 2017 09:00), Max: 101.2 (06 Nov 2017 20:55)  HR: 77 (07 Nov 2017 09:00) (74 - 102)  BP: 102/62 (07 Nov 2017 09:00) (98/66 - 121/75)  BP(mean): --  RR: 17 (07 Nov 2017 09:00) (16 - 77)  SpO2: 95% (07 Nov 2017 09:00) (95% - 99%)    LABS:                        14.0   10.5  )-----------( 186      ( 07 Nov 2017 07:52 )             40.5     11-07    136  |  96  |  17  ----------------------------<  150<H>  5.0   |  28  |  0.86    Ca    9.7      07 Nov 2017 07:52            REVIEW OF SYSTEMS:  CONSTITUTIONAL: Continues to spike fevers since yesterday, but fatigue improved.   NECK: Cont to have b/l shoulder stiffness. improved since yesterday.   GASTROINTESTINAL: Pt reports passing gas. Frequent bowel movements--yesterday diarrhea. No abdominal or epigastric pain. No nausea, vomiting. GENITOURINARY: No dysuria, frequency, or incontinence.   NEUROLOGICAL: Improved, but persistent generalized headaches--continuous but mod well alleviated with Dilaudid, no loss of strength, no numbness, or tremors. Yesterday AM reported dizziness or lightheadedness with pain medications.     FUNCTIONAL ASSESSMENT:  PAIN SCORE AT REST:    5-6/10     SCALE USED: (1-10 VNRS)  PAIN SCORE WITH ACTIVITY:    No sig. increase     SCALE USED: (1-10 VNRS)    PAIN ASSESSMENT:  Pt states that pain control has improved yesterday since start of steroids. Still c/o muscle spasms are in b/l shoulders. Pain not as cont as prior, now more episodic.    GENERAL: NAD  HEAD:  Incision C/D/I, duracel over suture sites. No inflammation/redness.  NERVOUS SYSTEM:    Alert & Oriented X3, Good concentration;   Cranial nerves grossly intact  Motor exam:  NUNO x4, 5/5 in all 4 extr.                   [X] warm well perfused; capillary refill <3 seconds   Sensation intact to LT in UE/LE in 3 dermatomes  CHEST/LUNG: Clear to auscultation bilaterally; No rales, rhonchi, wheezing, or rubs  HEART: Regular rate and rhythm; No murmurs, rubs, or gallops  ABDOMEN: Soft, Nontender, Nondistended; Bowel sounds present  EXTREMITIES:  2+ Peripheral Pulses, No clubbing, cyanosis, or edema      ASSESSMENT:   51yo M w/ c/o drainage from incision site s/p acoustic neuroma resection 10/27    PLAN:   1. Opioids  Since yesterday 6am, pt has required: 5x dosing Dilaudid 4mg PO. Pain well controlled. Pt reporting improved relief with current dosing.   PLAN: C/w Dilaudid dosing. Will dc with short course.     2. Neuropathics  Pt was placed on Gabapentin 400mg TID for opioid sparing effect and anxiolytic benefits.   PLAN: Dc d/t sedation with co-admin yesterday with Dilaudid.     3. Adjuvants  Pt continues to complaining of muscle spasms/cramping in neck/back. Tylenol 650mg q6h PRN for Mild Pain. Pt on/not on Percocet.   PLAN: C/w Robaxin at lowered dose d/t sedation/AMS in AM yesterday with increased dose. Pt still c/o muscle stiffness, so would benefit from continued use.     4. Prophylactic:   Bowel regimen: Daily BMs, c/w current dosing.  Nausea PRN: Zofran PRN for Nausea, pt does not c/o current    5. Functional Goals:   Pt will get OOB with PT today. Pt will resume previous level of activity without impairment from surgery.     6. Additional Consults:   None recommended.     7. Additional Labs/Imaging:   None recommended.     8. Follow up, Discharge Planning:   Patient is set for discharge to: Home  Discharge is pending: Dc home tomorrow, still spiking fever  Pain Management follow up plan: F/u inpatient. NEUROSURGERY PAIN MANAGEMENT PROGRESS NOTE    OVERNIGHT EVENTS:  - Cont to spike fevers since o/n: T(F): 98.7 (07 Nov 2017 09:00), Max: 101.2 (06 Nov 2017 20:55)  - Plan for dc tomorrow    PLAN/RECOMMENDATIONS:  - Dc with short of course of Dilaudid PO & Robaxin, spoke with Psychiatrist, Dr. Ishaan Mohan concerning this plan given hx of addictive behaviors, Dr. Mohan is on board with current plan given post-operative complication, will call with any concern. Pt has supportive home environment and has been seeing Dr. Mohan regularly  - Dcd Gabapentin d/t somnolence with co-admin in AM yesterday, no SE with coadmin with Dilaudid     HPI:  Pt is 51yo male, well know to [facility] Neurosurgery service s/p L acoustic neuroma resection 10/27 by Dr. Simms and Dr. Nova, discharged home 10/30/2017, presents to the ED [facility] with c/o drainage from the incision site, which started 11/2/2017.  Pt denies fevers/chills, neck pain, photophobia, n/v, headache, sob, cp. (03 Nov 2017 03:06)      PAST MEDICAL & SURGICAL HISTORY:  Anxiety disorder  ADHD (attention deficit hyperactivity disorder)  HTN (hypertension)  Acoustic neuroma  S/P excision of acoustic neuroma      FAMILY HISTORY:  No pertinent family history in first degree relatives      Allergies    No Known Allergies    Intolerances        PAIN MEDICATIONS:  acetaminophen   Tablet 650 milliGRAM(s) Oral every 6 hours PRN  ALPRAZolam 1 milliGRAM(s) Oral two times a day  HYDROmorphone   Tablet 2 milliGRAM(s) Oral every 4 hours PRN  HYDROmorphone   Tablet 4 milliGRAM(s) Oral every 4 hours PRN  indomethacin 25 milliGRAM(s) Oral three times a day  methocarbamol 500 milliGRAM(s) Oral every 8 hours  ondansetron Injectable 4 milliGRAM(s) IV Push every 6 hours PRN  zolpidem 5 milliGRAM(s) Oral at bedtime PRN  zolpidem 5 milliGRAM(s) Oral at bedtime PRN    Heme:  enoxaparin Injectable 40 milliGRAM(s) SubCutaneous at bedtime    Antibiotics:    Cardiovascular:  metoprolol     tartrate 50 milliGRAM(s) Oral two times a day    GI:  docusate sodium 100 milliGRAM(s) Oral three times a day  pantoprazole    Tablet 40 milliGRAM(s) Oral before breakfast  senna 2 Tablet(s) Oral at bedtime PRN    Endocrine:  dexamethasone     Tablet 4 milliGRAM(s) Oral two times a day    All Other Medications:  nicotine -  14 mG/24Hr(s) Patch 1 patch Transdermal daily  sodium chloride 1 Gram(s) Oral three times a day      Vital Signs Last 24 Hrs  T(C): 37.1 (07 Nov 2017 09:00), Max: 38.4 (06 Nov 2017 17:15)  T(F): 98.7 (07 Nov 2017 09:00), Max: 101.2 (06 Nov 2017 20:55)  HR: 77 (07 Nov 2017 09:00) (74 - 102)  BP: 102/62 (07 Nov 2017 09:00) (98/66 - 121/75)  BP(mean): --  RR: 17 (07 Nov 2017 09:00) (16 - 77)  SpO2: 95% (07 Nov 2017 09:00) (95% - 99%)    LABS:                        14.0   10.5  )-----------( 186      ( 07 Nov 2017 07:52 )             40.5     11-07    136  |  96  |  17  ----------------------------<  150<H>  5.0   |  28  |  0.86    Ca    9.7      07 Nov 2017 07:52            REVIEW OF SYSTEMS:  CONSTITUTIONAL: Continues to spike fevers since yesterday, but fatigue improved.   NECK: Cont to have b/l shoulder stiffness. improved since yesterday.   GASTROINTESTINAL: Pt reports passing gas. Frequent bowel movements--yesterday diarrhea. No abdominal or epigastric pain. No nausea, vomiting. GENITOURINARY: No dysuria, frequency, or incontinence.   NEUROLOGICAL: Improved, but persistent generalized headaches--continuous but mod well alleviated with Dilaudid, no loss of strength, no numbness, or tremors. Yesterday AM reported dizziness or lightheadedness with pain medications.     FUNCTIONAL ASSESSMENT:  PAIN SCORE AT REST:    5-6/10     SCALE USED: (1-10 VNRS)  PAIN SCORE WITH ACTIVITY:    No sig. increase     SCALE USED: (1-10 VNRS)    PAIN ASSESSMENT:  Pt states that pain control has improved yesterday since start of steroids. Still c/o muscle spasms are in b/l shoulders. Pain not as cont as prior, now more episodic.    GENERAL: NAD  HEAD:  Incision C/D/I, duracel over suture sites. No inflammation/redness.  NERVOUS SYSTEM:    Alert & Oriented X3, Good concentration;   Cranial nerves grossly intact  Motor exam:  NUNO x4, 5/5 in all 4 extr.                   [X] warm well perfused; capillary refill <3 seconds   Sensation intact to LT in UE/LE in 3 dermatomes  CHEST/LUNG: Clear to auscultation bilaterally; No rales, rhonchi, wheezing, or rubs  HEART: Regular rate and rhythm; No murmurs, rubs, or gallops  ABDOMEN: Soft, Nontender, Nondistended; Bowel sounds present  EXTREMITIES:  2+ Peripheral Pulses, No clubbing, cyanosis, or edema      ASSESSMENT:   51yo M w/ c/o drainage from incision site s/p acoustic neuroma resection 10/27    PLAN:   1. Opioids  Since yesterday 6am, pt has required: 5x dosing Dilaudid 4mg PO. Pain well controlled. Pt reporting improved relief with current dosing.   PLAN: C/w Dilaudid dosing. Will dc with short course.     2. Neuropathics  Pt was placed on Gabapentin 400mg TID for opioid sparing effect and anxiolytic benefits.   PLAN: Dc d/t sedation with co-admin yesterday with Dilaudid.     3. Adjuvants  Pt continues to complaining of muscle spasms/cramping in neck/back. Tylenol 650mg q6h PRN for Mild Pain. Pt on/not on Percocet.   PLAN: C/w Robaxin at lowered dose d/t sedation/AMS in AM yesterday with increased dose. Pt still c/o muscle stiffness, so would benefit from continued use.     4. Prophylactic:   Bowel regimen: Daily BMs, c/w current dosing.  Nausea PRN: Zofran PRN for Nausea, pt does not c/o current    5. Functional Goals:   Pt will get OOB with PT today. Pt will resume previous level of activity without impairment from surgery.     6. Additional Consults:   None recommended.     7. Additional Labs/Imaging:   None recommended.     8. Follow up, Discharge Planning:   Patient is set for discharge to: Home  Discharge is pending: Dc home tomorrow, still spiking fever  Pain Management follow up plan: F/u inpatient. NEUROSURGERY PAIN MANAGEMENT PROGRESS NOTE    OVERNIGHT EVENTS:  - Cont to spike fevers since o/n: T(F): 98.7 (07 Nov 2017 09:00), Max: 101.2 (06 Nov 2017 20:55)  - Plan for dc tomorrow    PLAN/RECOMMENDATIONS:  - Dc with short of course of Dilaudid PO & Robaxin, spoke with Psychiatrist, Dr. Ishaan Mohan concerning this plan given hx of addictive behaviors, Dr. Mohan is on board with current plan given post-operative complication, will call with any concern. Pt has supportive home environment and has been seeing Dr. Mohan regularly  - Dcd Gabapentin d/t somnolence with co-admin in AM yesterday, no SE with admin of only Dilaudid     HPI:  Pt is 51yo male, well know to [facility] Neurosurgery service s/p L acoustic neuroma resection 10/27 by Dr. Simms and Dr. Nova, discharged home 10/30/2017, presents to the ED [facility] with c/o drainage from the incision site, which started 11/2/2017.  Pt denies fevers/chills, neck pain, photophobia, n/v, headache, sob, cp. (03 Nov 2017 03:06)      PAST MEDICAL & SURGICAL HISTORY:  Anxiety disorder  ADHD (attention deficit hyperactivity disorder)  HTN (hypertension)  Acoustic neuroma  S/P excision of acoustic neuroma      FAMILY HISTORY:  No pertinent family history in first degree relatives      Allergies    No Known Allergies    Intolerances        PAIN MEDICATIONS:  acetaminophen   Tablet 650 milliGRAM(s) Oral every 6 hours PRN  ALPRAZolam 1 milliGRAM(s) Oral two times a day  HYDROmorphone   Tablet 2 milliGRAM(s) Oral every 4 hours PRN  HYDROmorphone   Tablet 4 milliGRAM(s) Oral every 4 hours PRN  indomethacin 25 milliGRAM(s) Oral three times a day  methocarbamol 500 milliGRAM(s) Oral every 8 hours  ondansetron Injectable 4 milliGRAM(s) IV Push every 6 hours PRN  zolpidem 5 milliGRAM(s) Oral at bedtime PRN  zolpidem 5 milliGRAM(s) Oral at bedtime PRN    Heme:  enoxaparin Injectable 40 milliGRAM(s) SubCutaneous at bedtime    Antibiotics:    Cardiovascular:  metoprolol     tartrate 50 milliGRAM(s) Oral two times a day    GI:  docusate sodium 100 milliGRAM(s) Oral three times a day  pantoprazole    Tablet 40 milliGRAM(s) Oral before breakfast  senna 2 Tablet(s) Oral at bedtime PRN    Endocrine:  dexamethasone     Tablet 4 milliGRAM(s) Oral two times a day    All Other Medications:  nicotine -  14 mG/24Hr(s) Patch 1 patch Transdermal daily  sodium chloride 1 Gram(s) Oral three times a day      Vital Signs Last 24 Hrs  T(C): 37.1 (07 Nov 2017 09:00), Max: 38.4 (06 Nov 2017 17:15)  T(F): 98.7 (07 Nov 2017 09:00), Max: 101.2 (06 Nov 2017 20:55)  HR: 77 (07 Nov 2017 09:00) (74 - 102)  BP: 102/62 (07 Nov 2017 09:00) (98/66 - 121/75)  BP(mean): --  RR: 17 (07 Nov 2017 09:00) (16 - 77)  SpO2: 95% (07 Nov 2017 09:00) (95% - 99%)    LABS:                        14.0   10.5  )-----------( 186      ( 07 Nov 2017 07:52 )             40.5     11-07    136  |  96  |  17  ----------------------------<  150<H>  5.0   |  28  |  0.86    Ca    9.7      07 Nov 2017 07:52            REVIEW OF SYSTEMS:  CONSTITUTIONAL: Continues to spike fevers since yesterday, but fatigue improved.   NECK: Cont to have b/l shoulder stiffness. improved since yesterday.   GASTROINTESTINAL: Pt reports passing gas. Frequent bowel movements--yesterday diarrhea. No abdominal or epigastric pain. No nausea, vomiting. GENITOURINARY: No dysuria, frequency, or incontinence.   NEUROLOGICAL: Improved, but persistent generalized headaches--continuous but mod well alleviated with Dilaudid, no loss of strength, no numbness, or tremors. Yesterday AM reported dizziness or lightheadedness with pain medications.     FUNCTIONAL ASSESSMENT:  PAIN SCORE AT REST:    5-6/10     SCALE USED: (1-10 VNRS)  PAIN SCORE WITH ACTIVITY:    No sig. increase     SCALE USED: (1-10 VNRS)    PAIN ASSESSMENT:  Pt states that pain control has improved yesterday since start of steroids. Still c/o muscle spasms are in b/l shoulders. Pain not as cont as prior, now more episodic.    GENERAL: NAD  HEAD:  Incision C/D/I, duracel over suture sites. No inflammation/redness.  NERVOUS SYSTEM:    Alert & Oriented X3, Good concentration;   Cranial nerves grossly intact  Motor exam:  NUNO x4, 5/5 in all 4 extr.                   [X] warm well perfused; capillary refill <3 seconds   Sensation intact to LT in UE/LE in 3 dermatomes  CHEST/LUNG: Clear to auscultation bilaterally; No rales, rhonchi, wheezing, or rubs  HEART: Regular rate and rhythm; No murmurs, rubs, or gallops  ABDOMEN: Soft, Nontender, Nondistended; Bowel sounds present  EXTREMITIES:  2+ Peripheral Pulses, No clubbing, cyanosis, or edema      ASSESSMENT:   51yo M w/ c/o drainage from incision site s/p acoustic neuroma resection 10/27    PLAN:   1. Opioids  Since yesterday 6am, pt has required: 5x dosing Dilaudid 4mg PO. Pain well controlled. Pt reporting improved relief with current dosing.   PLAN: C/w Dilaudid dosing. Will dc with short course.     2. Neuropathics  Pt was placed on Gabapentin 400mg TID for opioid sparing effect and anxiolytic benefits.   PLAN: Dc d/t sedation with co-admin yesterday with Dilaudid.     3. Adjuvants  Pt continues to complaining of muscle spasms/cramping in neck/back. Tylenol 650mg q6h PRN for Mild Pain. Pt on/not on Percocet.   PLAN: C/w Robaxin at lowered dose d/t sedation/AMS in AM yesterday with increased dose. Pt still c/o muscle stiffness, so would benefit from continued use.     4. Prophylactic:   Bowel regimen: Daily BMs, c/w current dosing.  Nausea PRN: Zofran PRN for Nausea, pt does not c/o current    5. Functional Goals:   Pt will get OOB with PT today. Pt will resume previous level of activity without impairment from surgery.     6. Additional Consults:   None recommended.     7. Additional Labs/Imaging:   None recommended.     8. Follow up, Discharge Planning:   Patient is set for discharge to: Home  Discharge is pending: Dc home tomorrow, still spiking fever  Pain Management follow up plan: F/u inpatient.

## 2017-11-07 NOTE — DISCHARGE NOTE ADULT - CARE PLAN
Principal Discharge DX:	S/P excision of acoustic neuroma  Goal:	Return to previous level of function with fewer headaches  Instructions for follow-up, activity and diet:	1. You must wash your hair starting 24 hours after being home. Use your normal shampoo. During the shampoo, gently dab the sutures, do not rub your incision site vigorously. Call the office if you have drainage from your incision site.   2. Mild swelling around the incision is common. Keep incision open to air and dry.  3. Call Dr. Simms's office to set up the appointment for a wound check once you get home.  4. Inform the doctor immediately if you have a fever (above 101), chills, night  sweats, wound drainage, increasing wound redness or pain, nausea, vomiting, or  worsening headache.  B. ACTIVITY LEVEL  1. Fatigue is common following brain surgery, rest if you are tired.  2. You should get up and walk around every hour during the daytime.  3. No bending, lifting or twisting for the first 3 weeks, but walking is  recommended.  4. Drink plenty of water, stay out of the sun.  You were given a pain reliever to help with pain control. This is intended for short term use. You are not recommended to take alcohol when taking these meds, you are not recommended to take it concurrently with Xanax, you must space it out by more than 1 hour.

## 2017-11-07 NOTE — DISCHARGE NOTE ADULT - MEDICATION SUMMARY - MEDICATIONS TO TAKE
I will START or STAY ON the medications listed below when I get home from the hospital:    dexamethasone 2 mg oral tablet  -- 2 tab(s) by mouth 2 times a day 11/8-11/10   1 tab by mouth 2 times a day 11/11-11/13   1 tab by mouth once a day 11/14-11/15   -- It is very important that you take or use this exactly as directed.  Do not skip doses or discontinue unless directed by your doctor.  Obtain medical advice before taking any non-prescription drugs as some may affect the action of this medication.  Take with food or milk.    -- Indication: For Steroid taper, headache, inflammation    HYDROmorphone 4 mg oral tablet  -- 1 tab(s) by mouth every 6 hours, As Needed 1/2 tab for Mod Pain, 1 tab for Severe Pain MDD:4 tabs  -- Caution federal law prohibits the transfer of this drug to any person other  than the person for whom it was prescribed.  May cause drowsiness.  Alcohol may intensify this effect.  Use care when operating dangerous machinery.  This prescription cannot be refilled.  Using more of this medication than prescribed may cause serious breathing problems.    -- Indication: For Mod-Severe Pain    indomethacin 25 mg oral capsule  -- 1 cap(s) by mouth 3 times a day  -- Indication: For Headache    aspirin 81 mg oral tablet  -- 1 tab(s) by mouth once a day  -- Indication: For Hyperlipidemia, CV risk    acetaminophen 325 mg oral tablet  -- 2 tab(s) by mouth every 6 hours, As needed, For Temp greater than 38.5 C (101.3 F)  -- Indication: For Fever    acetaminophen 325 mg oral tablet  -- 2 tab(s) by mouth every 6 hours, As needed, Moderate Pain (4 - 6)  -- Indication: For Mild-Mod Pain    Xanax 1 mg oral tablet  -- 1 tab(s) by mouth 2 times a day  -- Indication: For Anxiety    Ambien 5 mg oral tablet  -- 1 tab(s) by mouth once a day (at bedtime), As Needed -for insomnia MDD:1   -- Caution federal law prohibits the transfer of this drug to any person other  than the person for whom it was prescribed.  May cause drowsiness.  Alcohol may intensify this effect.  Use care when operating dangerous machinery.    -- Indication: For Insomnia    Metoprolol Tartrate 50 mg oral tablet  -- 1 tab(s) by mouth 2 times a day  -- Indication: For Hypertension    Vyvanse 30 mg oral capsule  -- 1 cap(s) by mouth once a day (in the morning)  -- Indication: For ADHD    docusate sodium 100 mg oral capsule  -- 1 cap(s) by mouth 3 times a day  -- Indication: For Constipation    senna oral tablet  -- 2 tab(s) by mouth once a day (at bedtime)  -- Indication: For Constipation    sodium chloride 1 g oral tablet  -- 1 tab(s) by mouth 3 times a day  -- Indication: For Hyponatremia    methocarbamol 500 mg oral tablet  -- 1 tab(s) by mouth every 8 hours, As Needed Spasms/stiffness in shoulders  -- Indication: For Spasms/stiffness in shoulders    pantoprazole 40 mg oral delayed release tablet  -- 1 tab(s) by mouth once a day (before a meal)  -- Indication: For GERD    nicotine 7 mg/24 hr transdermal film, extended release  -- 7 milligram(s) by transdermal patch once a day  -- Indication: For Tobacco    buPROPion 150 mg/24 hours (XL) oral tablet, extended release  -- 1 tab(s) by mouth every 24 hours  -- Indication: For Depression

## 2017-11-07 NOTE — SPEECH LANGUAGE PATHOLOGY EVALUATION - SLP FLUENCY
impaired/Syllable and whole-word repetition impaired/Syllable and whole-word repetition. per wife, his dysfluency has improved recently.

## 2017-11-07 NOTE — PHYSICAL THERAPY INITIAL EVALUATION ADULT - PERTINENT HX OF CURRENT PROBLEM, REHAB EVAL
wound drainage s/p L sided acoustic neuroma resection 10/27 52 year old male presents to ED with wound drainage s/p L sided acoustic neuroma resection 10/27

## 2017-11-07 NOTE — DIETITIAN INITIAL EVALUATION ADULT. - ENERGY NEEDS
Height: 5'11" Weight: 180lbs, IBW 172lbs+/-10%, %%, BMI 25  ABW used for calculations as pt between % of IBW.   Needs adjusted 2/2 surgical incision

## 2017-11-07 NOTE — DISCHARGE NOTE ADULT - HOSPITAL COURSE
Pt is 53yo male, well know to [facility] Neurosurgery service s/p L acoustic neuroma resection 10/27 by Dr. Simms and Dr. Nova, discharged home 10/30/2017, presents to the ED [facility] with c/o drainage from the incision site, which started 11/2/2017.  Pt denies fevers/chills, neck pain, photophobia, n/v, headache, sob, cp.  D Demi placed dermabond over the incision All Cx and blood work negative for infection  ID consulted suggest aspectic meningitis  Pt started on steroids with improvement  DC home

## 2017-11-07 NOTE — SPEECH LANGUAGE PATHOLOGY EVALUATION - SLP CONVERSATIONAL SPEECH
During conversation, pt exhibited mild dysfluency and word-finding difficulties. At times during conversation, he was tangential and it was difficult to understand his train of thought. Clarifying questions helped to repair some of the communication. During the Cookie Theft Picture Description task, he was noted to have reduced organization of thoughts. For example, he started by describing one are of the picture and before completing the description, he jumped to another area of the picture and then returned to the previous thought. Word choice also appeared inaccurate at times. For example, he stated, "THe kids are worried about the cookie jar."

## 2017-11-07 NOTE — DISCHARGE NOTE ADULT - PATIENT PORTAL LINK FT
“You can access the Follow[facility] Patient Portal, offered by [facility], by registering with the following website: [email or web address]”

## 2017-11-07 NOTE — PROGRESS NOTE ADULT - SUBJECTIVE AND OBJECTIVE BOX
HPI:  Pt is 53yo male, well know to [facility] Neurosurgery service s/p L acoustic neuroma resection 10/27 by Dr. Simms and Dr. Nova, discharged home 10/30/2017, presents to the ED [facility] with c/o drainage from the incision site, which started 11/2/2017.  Pt denies fevers/chills, neck pain, photophobia, n/v, headache, sob, cp. (03 Nov 2017 03:06)    OVERNIGHT EVENTS:  Vital Signs Last 24 Hrs  T(C): 35.8 (07 Nov 2017 05:25), Max: 38.4 (06 Nov 2017 17:15)  T(F): 96.5 (07 Nov 2017 05:25), Max: 101.2 (06 Nov 2017 20:55)  HR: 74 (07 Nov 2017 05:25) (74 - 102)  BP: 112/74 (07 Nov 2017 05:25) (98/66 - 121/75)  BP(mean): --  RR: 18 (07 Nov 2017 05:25) (16 - 77)  SpO2: 95% (07 Nov 2017 05:25) (95% - 99%)    I&O's Summary    06 Nov 2017 07:01  -  07 Nov 2017 07:00  --------------------------------------------------------  IN: 300 mL / OUT: 750 mL / NET: -450 mL        PHYSICAL EXAM:  Neurological: A&OX3 Cranial nerves intact  NUNO 5/5  Left Crani incision CDI no drainage or redness      Cardiovascular: RRR  Respiratory: Lungs CTAB  Gastrointestinal: +BS  Genitourinary: Voiding without difficulty  Extremities: warm and dry  I    DIET: Regular  LABS:                        14.0   10.5  )-----------( 186      ( 07 Nov 2017 07:52 )             40.5     11-07    136  |  96  |  17  ----------------------------<  150<H>  5.0   |  28  |  0.86    Ca    9.7      07 Nov 2017 07:52      CAPILLARY BLOOD GLUCOSE    Drug Levels: [] N/A  Vancomycin Level, Trough: 10.7 ug/mL (11-04 @ 17:19)    CSF Analysis: [] N/A      Allergies    No Known Allergies    Intolerances      MEDICATIONS:  Antibiotics:    Neuro:  acetaminophen   Tablet 650 milliGRAM(s) Oral every 6 hours PRN  ALPRAZolam 1 milliGRAM(s) Oral two times a day  gabapentin 400 milliGRAM(s) Oral every 8 hours  HYDROmorphone   Tablet 2 milliGRAM(s) Oral every 4 hours PRN  HYDROmorphone   Tablet 4 milliGRAM(s) Oral every 4 hours PRN  indomethacin 25 milliGRAM(s) Oral three times a day  methocarbamol 750 milliGRAM(s) Oral every 8 hours  ondansetron Injectable 4 milliGRAM(s) IV Push every 6 hours PRN  zolpidem 5 milliGRAM(s) Oral at bedtime PRN  zolpidem 5 milliGRAM(s) Oral at bedtime PRN    Anticoagulation:  enoxaparin Injectable 40 milliGRAM(s) SubCutaneous at bedtime    OTHER:  dexamethasone     Tablet 4 milliGRAM(s) Oral every 6 hours  docusate sodium 100 milliGRAM(s) Oral three times a day  metoprolol     tartrate 50 milliGRAM(s) Oral two times a day  nicotine -  14 mG/24Hr(s) Patch 1 patch Transdermal daily  pantoprazole    Tablet 40 milliGRAM(s) Oral before breakfast  senna 2 Tablet(s) Oral at bedtime PRN    IVF:  sodium chloride 1 Gram(s) Oral three times a day    CULTURES:  Culture Results:   No growth at 2 days. (11-05 @ 00:22)        ASSESSMENT:  52y Male s/p L SOC  resection acoustic neuroma  re admitted wound draiange  s/p dermobond placed  ID Consulted r/o aseptic meningitis   started steroids and improved    PLAN:    NEURO:    Monitor neuro status  Continue steroids   Pain Managemnt  Bowel regime  Continue current medical regime  F/U ID    Dispo: Discussed with attenidng

## 2017-11-07 NOTE — DISCHARGE NOTE ADULT - NS AS ACTIVITY OBS
Walking-Outdoors allowed/No Heavy lifting/straining/Do not make important decisions/Do not drive or operate machinery/Walking-Indoors allowed/Stairs allowed/Showering allowed

## 2017-11-07 NOTE — DISCHARGE NOTE ADULT - MEDICATION SUMMARY - MEDICATIONS TO STOP TAKING
I will STOP taking the medications listed below when I get home from the hospital:    dexamethasone 2 mg oral tablet  -- 1 tab(s) by mouth 2 times a day MDD:2    Percocet 5/325 oral tablet  -- 1 tab(s) by mouth every 6 hours MDD:4

## 2017-11-07 NOTE — PHYSICAL THERAPY INITIAL EVALUATION ADULT - FOLLOWS COMMANDS/ANSWERS QUESTIONS, REHAB EVAL
some word finding difficulty/100% of the time/able to follow multistep instructions/able to follow single-step instructions/aphasia

## 2017-11-07 NOTE — PROGRESS NOTE ADULT - SUBJECTIVE AND OBJECTIVE BOX
INTERVAL HPI/OVERNIGHT EVENTS:  Had T 101.2 overnight.  Head pain is markedly reduced - is now only left temporoparietal    CONSTITUTIONAL:  No fever, chills, night sweats  EYES:  No photophobia or visual changes  CARDIOVASCULAR:  No chest pain  RESPIRATORY:  No cough, wheezing, or SOB   GASTROINTESTINAL:  No nausea, vomiting, diarrhea, constipation, or abdominal pain  GENITOURINARY:  No frequency, urgency, dysuria or hematuria  NEUROLOGIC:  No headache, lightheadedness      ANTIBIOTICS/RELEVANT:  None        Vital Signs Last 24 Hrs  T(C): 36.3 (07 Nov 2017 17:30), Max: 38.4 (06 Nov 2017 20:55)  T(F): 97.4 (07 Nov 2017 17:30), Max: 101.2 (06 Nov 2017 20:55)  HR: 81 (07 Nov 2017 17:30) (74 - 102)  BP: 113/65 (07 Nov 2017 17:30) (102/62 - 121/75)  BP(mean): --  RR: 16 (07 Nov 2017 17:30) (16 - 18)  SpO2: 95% (07 Nov 2017 17:30) (94% - 96%)    PHYSICAL EXAM:  Constitutional:  Well-developed, well nourished, lethargic but arousable on pain meds  Head:  Scalp incision glued, no leakage, no erythema or warmth  Eyes:  Sclerae anicteric, conjunctivae clear, RENETTA, EOMI  Ear/Nose/Throat:  No nasal exudate or sinus tenderness;  No buccal mucosal lesions, no pharyngeal erythema or exudate	  Neck:  Supple, no adenopathy  Respiratory:  Clear bilaterally  Cardiovascular:  RRR, S1S2, no murmur appreciated  Gastrointestinal:  Symmetric, normoactive BS, soft, NT, no masses, guarding or rebound.  No HSM  Extremities:  No edema      LABS:                        14.0   10.5  )-----------( 186      ( 07 Nov 2017 07:52 )             40.5         11-07    136  |  96  |  17  ----------------------------<  150<H>  5.0   |  28  |  0.86    Ca    9.7      07 Nov 2017 07:52            MICROBIOLOGY:  Blood culture X 1 11/5 - NGTD    RADIOLOGY & ADDITIONAL STUDIES:  No new studies

## 2017-11-07 NOTE — DISCHARGE NOTE ADULT - CARE PROVIDER_API CALL
Kaushik Simms), Neurological Surgery  [address]  [address]  [place] [address]  Phone: [phone]  Fax: [phone] Kaushik Simms), Neurological Surgery  [address]  [address]  [place] [address]  Phone: [phone]  Fax: [phone]    Ousmane Brannon), Anesthesiology; Pain Medicine  [address] [place] [address]  Phone: [phone]  Fax: [phone]

## 2017-11-07 NOTE — SPEECH LANGUAGE PATHOLOGY EVALUATION - SLP DIAGNOSIS
Pt p/w expressive language deficits Pt p/w higher-level expressive language and cognitive-linguistic deficits characterized by word-finding difficulties, reduced narrative organization, and difficulty w mental manipulation and memory tasks. Pt benefits from additional response time, repetition and clarifying questions to improve communication.

## 2017-11-07 NOTE — DISCHARGE NOTE ADULT - PLAN OF CARE
Return to previous level of function with fewer headaches 1. You must wash your hair starting 24 hours after being home. Use your normal shampoo. During the shampoo, gently dab the sutures, do not rub your incision site vigorously. Call the office if you have drainage from your incision site.   2. Mild swelling around the incision is common. Keep incision open to air and dry.  3. Call Dr. Simms's office to set up the appointment for a wound check once you get home.  4. Inform the doctor immediately if you have a fever (above 101), chills, night  sweats, wound drainage, increasing wound redness or pain, nausea, vomiting, or  worsening headache.  B. ACTIVITY LEVEL  1. Fatigue is common following brain surgery, rest if you are tired.  2. You should get up and walk around every hour during the daytime.  3. No bending, lifting or twisting for the first 3 weeks, but walking is  recommended.  4. Drink plenty of water, stay out of the sun.  You were given a pain reliever to help with pain control. This is intended for short term use. You are not recommended to take alcohol when taking these meds, you are not recommended to take it concurrently with Xanax, you must space it out by more than 1 hour.

## 2017-11-08 VITALS
TEMPERATURE: 97 F | SYSTOLIC BLOOD PRESSURE: 133 MMHG | HEART RATE: 79 BPM | OXYGEN SATURATION: 96 % | RESPIRATION RATE: 17 BRPM | DIASTOLIC BLOOD PRESSURE: 82 MMHG

## 2017-11-08 PROCEDURE — 80053 COMPREHEN METABOLIC PANEL: CPT

## 2017-11-08 PROCEDURE — 84300 ASSAY OF URINE SODIUM: CPT

## 2017-11-08 PROCEDURE — 80048 BASIC METABOLIC PNL TOTAL CA: CPT

## 2017-11-08 PROCEDURE — 92523 SPEECH SOUND LANG COMPREHEN: CPT | Mod: GN

## 2017-11-08 PROCEDURE — 86901 BLOOD TYPING SEROLOGIC RH(D): CPT

## 2017-11-08 PROCEDURE — 87116 MYCOBACTERIA CULTURE: CPT

## 2017-11-08 PROCEDURE — 93970 EXTREMITY STUDY: CPT

## 2017-11-08 PROCEDURE — 80202 ASSAY OF VANCOMYCIN: CPT

## 2017-11-08 PROCEDURE — 87206 SMEAR FLUORESCENT/ACID STAI: CPT

## 2017-11-08 PROCEDURE — 82962 GLUCOSE BLOOD TEST: CPT

## 2017-11-08 PROCEDURE — 99285 EMERGENCY DEPT VISIT HI MDM: CPT | Mod: 25

## 2017-11-08 PROCEDURE — 36415 COLL VENOUS BLD VENIPUNCTURE: CPT

## 2017-11-08 PROCEDURE — 85652 RBC SED RATE AUTOMATED: CPT

## 2017-11-08 PROCEDURE — 86850 RBC ANTIBODY SCREEN: CPT

## 2017-11-08 PROCEDURE — 71045 X-RAY EXAM CHEST 1 VIEW: CPT

## 2017-11-08 PROCEDURE — 81001 URINALYSIS AUTO W/SCOPE: CPT

## 2017-11-08 PROCEDURE — 85610 PROTHROMBIN TIME: CPT

## 2017-11-08 PROCEDURE — 85027 COMPLETE CBC AUTOMATED: CPT

## 2017-11-08 PROCEDURE — 86140 C-REACTIVE PROTEIN: CPT

## 2017-11-08 PROCEDURE — 86900 BLOOD TYPING SEROLOGIC ABO: CPT

## 2017-11-08 PROCEDURE — 85730 THROMBOPLASTIN TIME PARTIAL: CPT

## 2017-11-08 PROCEDURE — 70450 CT HEAD/BRAIN W/O DYE: CPT

## 2017-11-08 PROCEDURE — 85025 COMPLETE CBC W/AUTO DIFF WBC: CPT

## 2017-11-08 PROCEDURE — 87040 BLOOD CULTURE FOR BACTERIA: CPT

## 2017-11-08 PROCEDURE — 83930 ASSAY OF BLOOD OSMOLALITY: CPT

## 2017-11-08 PROCEDURE — 97161 PT EVAL LOW COMPLEX 20 MIN: CPT

## 2017-11-08 PROCEDURE — 83935 ASSAY OF URINE OSMOLALITY: CPT

## 2017-11-08 RX ORDER — PANTOPRAZOLE SODIUM 20 MG/1
1 TABLET, DELAYED RELEASE ORAL
Qty: 8 | Refills: 0 | OUTPATIENT
Start: 2017-11-08 | End: 2017-11-16

## 2017-11-08 RX ORDER — INDOMETHACIN 50 MG
1 CAPSULE ORAL
Qty: 0 | Refills: 0 | COMMUNITY
Start: 2017-11-08

## 2017-11-08 RX ORDER — SODIUM CHLORIDE 9 MG/ML
1 INJECTION INTRAMUSCULAR; INTRAVENOUS; SUBCUTANEOUS
Qty: 0 | Refills: 0 | COMMUNITY
Start: 2017-11-08

## 2017-11-08 RX ORDER — METHOCARBAMOL 500 MG/1
1 TABLET, FILM COATED ORAL
Qty: 21 | Refills: 0 | OUTPATIENT
Start: 2017-11-08 | End: 2017-11-15

## 2017-11-08 RX ORDER — HYDROMORPHONE HYDROCHLORIDE 2 MG/ML
1 INJECTION INTRAMUSCULAR; INTRAVENOUS; SUBCUTANEOUS
Qty: 28 | Refills: 0 | OUTPATIENT
Start: 2017-11-08 | End: 2017-11-15

## 2017-11-08 RX ORDER — DEXAMETHASONE 0.5 MG/5ML
2 ELIXIR ORAL
Qty: 19 | Refills: 0 | OUTPATIENT
Start: 2017-11-08 | End: 2017-11-15

## 2017-11-08 RX ADMIN — SODIUM CHLORIDE 1 GRAM(S): 9 INJECTION INTRAMUSCULAR; INTRAVENOUS; SUBCUTANEOUS at 05:54

## 2017-11-08 RX ADMIN — Medication 25 MILLIGRAM(S): at 06:54

## 2017-11-08 RX ADMIN — Medication 1 MILLIGRAM(S): at 18:22

## 2017-11-08 RX ADMIN — Medication 100 MILLIGRAM(S): at 15:14

## 2017-11-08 RX ADMIN — Medication 50 MILLIGRAM(S): at 05:58

## 2017-11-08 RX ADMIN — HYDROMORPHONE HYDROCHLORIDE 4 MILLIGRAM(S): 2 INJECTION INTRAMUSCULAR; INTRAVENOUS; SUBCUTANEOUS at 02:44

## 2017-11-08 RX ADMIN — METHOCARBAMOL 500 MILLIGRAM(S): 500 TABLET, FILM COATED ORAL at 05:58

## 2017-11-08 RX ADMIN — ZOLPIDEM TARTRATE 5 MILLIGRAM(S): 10 TABLET ORAL at 00:02

## 2017-11-08 RX ADMIN — Medication 1 MILLIGRAM(S): at 05:58

## 2017-11-08 RX ADMIN — Medication 100 MILLIGRAM(S): at 05:54

## 2017-11-08 RX ADMIN — Medication 25 MILLIGRAM(S): at 05:54

## 2017-11-08 RX ADMIN — Medication 1 PATCH: at 12:21

## 2017-11-08 RX ADMIN — SODIUM CHLORIDE 1 GRAM(S): 9 INJECTION INTRAMUSCULAR; INTRAVENOUS; SUBCUTANEOUS at 15:14

## 2017-11-08 RX ADMIN — Medication 4 MILLIGRAM(S): at 05:54

## 2017-11-08 RX ADMIN — HYDROMORPHONE HYDROCHLORIDE 4 MILLIGRAM(S): 2 INJECTION INTRAMUSCULAR; INTRAVENOUS; SUBCUTANEOUS at 16:18

## 2017-11-08 RX ADMIN — Medication 4 MILLIGRAM(S): at 18:22

## 2017-11-08 RX ADMIN — PANTOPRAZOLE SODIUM 40 MILLIGRAM(S): 20 TABLET, DELAYED RELEASE ORAL at 06:03

## 2017-11-08 RX ADMIN — HYDROMORPHONE HYDROCHLORIDE 4 MILLIGRAM(S): 2 INJECTION INTRAMUSCULAR; INTRAVENOUS; SUBCUTANEOUS at 15:18

## 2017-11-08 RX ADMIN — Medication 25 MILLIGRAM(S): at 15:14

## 2017-11-08 RX ADMIN — HYDROMORPHONE HYDROCHLORIDE 4 MILLIGRAM(S): 2 INJECTION INTRAMUSCULAR; INTRAVENOUS; SUBCUTANEOUS at 04:12

## 2017-11-08 RX ADMIN — METHOCARBAMOL 500 MILLIGRAM(S): 500 TABLET, FILM COATED ORAL at 15:14

## 2017-11-08 NOTE — PROGRESS NOTE ADULT - PROVIDER SPECIALTY LIST ADULT
Infectious Disease
Neurosurgery
Pain Medicine
Neurosurgery

## 2017-11-08 NOTE — PROVIDER CONTACT NOTE (OTHER) - ASSESSMENT
EDUCATED ON TAKING PAIN MEDICATION AND PAIN MEDICATION ADMINISTERED.  CONTINUE TO STATE UNRESOLVED PAIN.  MD CONTACTED TO COME AND ASSESS
oral thermometer resulted 101

## 2017-11-08 NOTE — PROGRESS NOTE ADULT - SUBJECTIVE AND OBJECTIVE BOX
NEUROSURGERY PAIN MANAGEMENT PROGRESS NOTE    OVERNIGHT EVENTS:  - No acute events overnight, no episodes of sedation, pt has been stable on current regimen.    PLAN/RECOMMENDATIONS:  - Dc with short course of Dilaudid PO for HA. Pt using less frequently daily.    HPI:  Pt is 53yo male, well know to [facility] Neurosurgery service s/p L acoustic neuroma resection 10/27 by Dr. Simms and Dr. Nova, discharged home 10/30/2017, presents to the ED [facility] with c/o drainage from the incision site, which started 11/2/2017.  Pt denies fevers/chills, neck pain, photophobia, n/v, headache, sob, cp. (03 Nov 2017 03:06)      PAST MEDICAL & SURGICAL HISTORY:  Anxiety disorder  ADHD (attention deficit hyperactivity disorder)  HTN (hypertension)  Acoustic neuroma  S/P excision of acoustic neuroma      FAMILY HISTORY:  No pertinent family history in first degree relatives      Allergies    No Known Allergies    Intolerances        PAIN MEDICATIONS:  acetaminophen   Tablet 650 milliGRAM(s) Oral every 6 hours PRN  ALPRAZolam 1 milliGRAM(s) Oral two times a day  HYDROmorphone   Tablet 2 milliGRAM(s) Oral every 4 hours PRN  HYDROmorphone   Tablet 4 milliGRAM(s) Oral every 4 hours PRN  indomethacin 25 milliGRAM(s) Oral three times a day  LORazepam   Injectable 2 milliGRAM(s) IV Push once  methocarbamol 500 milliGRAM(s) Oral every 8 hours  ondansetron Injectable 4 milliGRAM(s) IV Push every 6 hours PRN  zolpidem 5 milliGRAM(s) Oral at bedtime PRN  zolpidem 5 milliGRAM(s) Oral at bedtime PRN    Heme:  enoxaparin Injectable 40 milliGRAM(s) SubCutaneous at bedtime    Antibiotics:    Cardiovascular:  metoprolol     tartrate 50 milliGRAM(s) Oral two times a day    GI:  docusate sodium 100 milliGRAM(s) Oral three times a day  pantoprazole    Tablet 40 milliGRAM(s) Oral before breakfast  senna 2 Tablet(s) Oral at bedtime PRN    Endocrine:  dexamethasone     Tablet 4 milliGRAM(s) Oral two times a day    All Other Medications:  nicotine -  14 mG/24Hr(s) Patch 1 patch Transdermal daily  sodium chloride 1 Gram(s) Oral three times a day      Vital Signs Last 24 Hrs  T(C): 36.1 (08 Nov 2017 09:10), Max: 37.4 (07 Nov 2017 14:08)  T(F): 97 (08 Nov 2017 09:10), Max: 99.4 (07 Nov 2017 14:08)  HR: 69 (08 Nov 2017 09:10) (69 - 88)  BP: 119/69 (08 Nov 2017 09:10) (113/65 - 129/78)  BP(mean): --  RR: 17 (08 Nov 2017 09:10) (16 - 17)  SpO2: 96% (08 Nov 2017 09:10) (94% - 97%)    LABS:                        14.0   10.5  )-----------( 186      ( 07 Nov 2017 07:52 )             40.5     11-07    136  |  96  |  17  ----------------------------<  150<H>  5.0   |  28  |  0.86    Ca    9.7      07 Nov 2017 07:52    REVIEW OF SYSTEMS:  CONSTITUTIONAL: Difficulty sleeping overnight.  EYES: No eye pain, visual disturbances  ENMT:  No difficulty hearing. No throat pain  NECK: Improved stiffness. No neck pain.  GASTROINTESTINAL: Pt reports passing gas. No bowel movements. No abdominal or epigastric pain. No nausea, vomiting. GENITOURINARY: No dysuria, frequency, or incontinence.  NEUROLOGICAL: Improving headaches. No loss of strength, numbness, or tremors. No dizziness or lightheadedness with pain medications.     FUNCTIONAL ASSESSMENT:  PAIN SCORE AT REST:  8/10      SCALE USED: (1-10 VNRS)  PAIN SCORE WITH ACTIVITY:  No sig. increase       SCALE USED: (1-10 VNRS)    PAIN ASSESSMENT:  Pt states that pain control has improved this week with steroid use and PO Dilaudid. Sent home on slow taper. Reports that stiffness in shoulders is improved.  Pain not as cont as prior, now more episodic.    GENERAL: NAD  HEAD:  Incision C/D/I, duracel over suture sites. No inflammation/redness. BOWEN.  NERVOUS SYSTEM:    Alert & Oriented X3, Good concentration; Slight expressive aphasia.  Cranial nerves grossly intact  Motor exam:  NUNO x4, 5/5 in all 4 extr.                   [X] warm well perfused; capillary refill <3 seconds   Sensation intact to LT in UE/LE in 3 dermatomes  CHEST/LUNG: Clear to auscultation bilaterally; No rales, rhonchi, wheezing, or rubs  HEART: Regular rate and rhythm; No murmurs, rubs, or gallops  ABDOMEN: Soft, Nontender, Nondistended; Bowel sounds present  EXTREMITIES:  2+ Peripheral Pulses, No clubbing, cyanosis, or edema    ASSESSMENT:   53yo M w/ c/o drainage from incision site s/p acoustic neuroma resection 10/27    PLAN:   1. Opioids  Since yesterday 6am, pt has required: 3x dosing Dilaudid 4mg PO. Pain well controlled. No requirement this Am.   PLAN: Dc with 5 day course of Dilaudid 4mg q6h PRN.     2. Neuropathics  Pt was placed on Gabapentin 400mg TID for opioid sparing effect and anxiolytic benefits. Dcd for sedation with co-admin yesterday with Dilaudid.   PLAN: No indication.    3. Adjuvants  Pt reports improved, but persistent muscle spasms/cramping in neck/back. Tylenol 650mg q6h PRN for Mild Pain. Pt not on Percocet.   PLAN: C/w Robaxin at home, dcd with as needed prescription. Pt on Ambien at home, extensively counseled to not take in co-admin with Dilaudid PO.    4. Prophylactic:   Bowel regimen: Daily BMs, c/w current dosing.  Nausea PRN: Zofran PRN for Nausea, pt does not c/o current    5. Functional Goals:   Pt will get OOB with PT today. Pt will resume previous level of activity without impairment from surgery.     6. Additional Consults:   None recommended.     7. Additional Labs/Imaging:   None recommended.     8. Follow up, Discharge Planning:   Patient is set for discharge to: Home  Discharge is pending: Dc today.  Pain Management follow up plan: F/u inpatient.

## 2017-11-08 NOTE — PROVIDER CONTACT NOTE (OTHER) - BACKGROUND
PT COMPLAINING OF UNRESOLVED PAIN.  REQUESTED SLEEPING PILLS AND STATED THAT HE FEELS THAT HE'S HEAD IS POUNDING.
patient given IV Tylenol prior

## 2017-11-08 NOTE — PROGRESS NOTE ADULT - SUBJECTIVE AND OBJECTIVE BOX
HPI:  Pt is 51yo male, well know to [facility] Neurosurgery service s/p L acoustic neuroma resection 10/27 by Dr. Simms and Dr. Nova, discharged home 10/30/2017, presents to the ED [facility] with c/o drainage from the incision site, which started 11/2/2017.  Pt denies fevers/chills, neck pain, photophobia, n/v, headache, sob, cp. (03 Nov 2017 03:06)    OVERNIGHT EVENTS:  Vital Signs Last 24 Hrs  T(C): 36.1 (08 Nov 2017 05:43), Max: 37.4 (07 Nov 2017 14:08)  T(F): 97 (08 Nov 2017 05:43), Max: 99.4 (07 Nov 2017 14:08)  HR: 88 (08 Nov 2017 05:43) (71 - 88)  BP: 129/78 (08 Nov 2017 05:43) (113/65 - 129/78)  BP(mean): --  RR: 16 (08 Nov 2017 05:43) (16 - 17)  SpO2: 97% (08 Nov 2017 05:43) (94% - 97%)    I&O's Summary    07 Nov 2017 07:01  -  08 Nov 2017 07:00  --------------------------------------------------------  IN: 1120 mL / OUT: 1450 mL / NET: -330 mL        PHYSICAL EXAM:  Neurological:A&OX3 Cranial nerves intact  NUNO 5/5  L SOC incision CDI no drainage  no nuchal rigidity, no photophobia    Cardiovascular: RRR  Respiratory: Lungs CTAB  Gastrointestinal: +BS  +BM  Genitourinary: Voiding without difficulty  Extremities: warm and dry    DIET:  Regular    LABS:                        14.0   10.5  )-----------( 186      ( 07 Nov 2017 07:52 )             40.5     11-07    136  |  96  |  17  ----------------------------<  150<H>  5.0   |  28  |  0.86    Ca    9.7      07 Nov 2017 07:52        CAPILLARY BLOOD GLUCOSE          Drug Levels: [] N/A  Vancomycin Level, Trough: 10.7 ug/mL (11-04 @ 17:19)    CSF Analysis: [] N/A      Allergies    No Known Allergies    Intolerances      MEDICATIONS:  Antibiotics:    Neuro:  acetaminophen   Tablet 650 milliGRAM(s) Oral every 6 hours PRN  ALPRAZolam 1 milliGRAM(s) Oral two times a day  HYDROmorphone   Tablet 2 milliGRAM(s) Oral every 4 hours PRN  HYDROmorphone   Tablet 4 milliGRAM(s) Oral every 4 hours PRN  indomethacin 25 milliGRAM(s) Oral three times a day  LORazepam   Injectable 2 milliGRAM(s) IV Push once  methocarbamol 500 milliGRAM(s) Oral every 8 hours  ondansetron Injectable 4 milliGRAM(s) IV Push every 6 hours PRN  zolpidem 5 milliGRAM(s) Oral at bedtime PRN  zolpidem 5 milliGRAM(s) Oral at bedtime PRN    Anticoagulation:  enoxaparin Injectable 40 milliGRAM(s) SubCutaneous at bedtime    OTHER:  dexamethasone     Tablet 4 milliGRAM(s) Oral two times a day  docusate sodium 100 milliGRAM(s) Oral three times a day  metoprolol     tartrate 50 milliGRAM(s) Oral two times a day  nicotine -  14 mG/24Hr(s) Patch 1 patch Transdermal daily  pantoprazole    Tablet 40 milliGRAM(s) Oral before breakfast  senna 2 Tablet(s) Oral at bedtime PRN    IVF:  sodium chloride 1 Gram(s) Oral three times a day    CULTURES:  Culture Results:   No growth at 3 days. (11-05 @ 00:22)    RADIOLOGY & ADDITIONAL TESTS:      ASSESSMENT:     52y Male s/p L SOC  resection acoustic neuroma  re admitted wound draiange  s/p dermobond placed  ID Consulted r/o aseptic meningitis   started steroids and improved    PLAN:    NEURO:    Monitor neuro status  Steroid taper over week  Pain Management  Bowel regime  Continue current medical regime    Dispo: Discussed with attending

## 2017-11-09 DIAGNOSIS — F41.9 ANXIETY DISORDER, UNSPECIFIED: ICD-10-CM

## 2017-11-09 DIAGNOSIS — I10 ESSENTIAL (PRIMARY) HYPERTENSION: ICD-10-CM

## 2017-11-09 DIAGNOSIS — G43.909 MIGRAINE, UNSPECIFIED, NOT INTRACTABLE, WITHOUT STATUS MIGRAINOSUS: ICD-10-CM

## 2017-11-09 DIAGNOSIS — F90.9 ATTENTION-DEFICIT HYPERACTIVITY DISORDER, UNSPECIFIED TYPE: ICD-10-CM

## 2017-11-09 DIAGNOSIS — F17.210 NICOTINE DEPENDENCE, CIGARETTES, UNCOMPLICATED: ICD-10-CM

## 2017-11-09 DIAGNOSIS — D33.3 BENIGN NEOPLASM OF CRANIAL NERVES: ICD-10-CM

## 2017-11-09 DIAGNOSIS — H91.92 UNSPECIFIED HEARING LOSS, LEFT EAR: ICD-10-CM

## 2017-11-10 LAB
CULTURE RESULTS: SIGNIFICANT CHANGE UP
SPECIMEN SOURCE: SIGNIFICANT CHANGE UP

## 2017-11-12 DIAGNOSIS — F41.9 ANXIETY DISORDER, UNSPECIFIED: ICD-10-CM

## 2017-11-12 DIAGNOSIS — I10 ESSENTIAL (PRIMARY) HYPERTENSION: ICD-10-CM

## 2017-11-12 DIAGNOSIS — F90.9 ATTENTION-DEFICIT HYPERACTIVITY DISORDER, UNSPECIFIED TYPE: ICD-10-CM

## 2017-11-12 DIAGNOSIS — Z79.82 LONG TERM (CURRENT) USE OF ASPIRIN: ICD-10-CM

## 2017-11-12 DIAGNOSIS — T81.89XA OTHER COMPLICATIONS OF PROCEDURES, NOT ELSEWHERE CLASSIFIED, INITIAL ENCOUNTER: ICD-10-CM

## 2017-11-12 DIAGNOSIS — D33.3 BENIGN NEOPLASM OF CRANIAL NERVES: ICD-10-CM

## 2017-11-16 ENCOUNTER — INPATIENT (INPATIENT)
Facility: HOSPITAL | Age: 52
LOS: 3 days | Discharge: HOME | DRG: 920 | End: 2017-11-20
Attending: NEUROLOGICAL SURGERY | Admitting: NEUROLOGICAL SURGERY
Payer: COMMERCIAL

## 2017-11-16 VITALS
RESPIRATION RATE: 18 BRPM | WEIGHT: 182.1 LBS | OXYGEN SATURATION: 97 % | DIASTOLIC BLOOD PRESSURE: 96 MMHG | TEMPERATURE: 98 F | SYSTOLIC BLOOD PRESSURE: 152 MMHG | HEART RATE: 69 BPM
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
## 2017-11-16 DIAGNOSIS — Z98.890 OTHER SPECIFIED POSTPROCEDURAL STATES: Chronic | ICD-10-CM
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
## 2017-11-16 DIAGNOSIS — T81.31XA DISRUPTION OF EXTERNAL OPERATION (SURGICAL) WOUND, NOT ELSEWHERE CLASSIFIED, INITIAL ENCOUNTER: ICD-10-CM

## 2017-11-16 LAB
ALBUMIN SERPL ELPH-MCNC: 4.6 G/DL — SIGNIFICANT CHANGE UP (ref 3.3–5)
ALP SERPL-CCNC: 34 U/L — LOW (ref 40–120)
ALT FLD-CCNC: 27 U/L — SIGNIFICANT CHANGE UP (ref 10–45)
ANION GAP SERPL CALC-SCNC: 14 MMOL/L — SIGNIFICANT CHANGE UP (ref 5–17)
APTT BLD: 22.6 SEC — LOW (ref 27.5–37.4)
AST SERPL-CCNC: 17 U/L — SIGNIFICANT CHANGE UP (ref 10–40)
BASOPHILS NFR BLD AUTO: 0.3 % — SIGNIFICANT CHANGE UP (ref 0–2)
BILIRUB SERPL-MCNC: 0.8 MG/DL — SIGNIFICANT CHANGE UP (ref 0.2–1.2)
BUN SERPL-MCNC: 18 MG/DL — SIGNIFICANT CHANGE UP (ref 7–23)
CALCIUM SERPL-MCNC: 9.1 MG/DL — SIGNIFICANT CHANGE UP (ref 8.4–10.5)
CHLORIDE SERPL-SCNC: 97 MMOL/L — SIGNIFICANT CHANGE UP (ref 96–108)
CO2 SERPL-SCNC: 25 MMOL/L — SIGNIFICANT CHANGE UP (ref 22–31)
CREAT SERPL-MCNC: 0.89 MG/DL — SIGNIFICANT CHANGE UP (ref 0.5–1.3)
EOSINOPHIL NFR BLD AUTO: 0.6 % — SIGNIFICANT CHANGE UP (ref 0–6)
GLUCOSE SERPL-MCNC: 97 MG/DL — SIGNIFICANT CHANGE UP (ref 70–99)
HCT VFR BLD CALC: 39.2 % — SIGNIFICANT CHANGE UP (ref 39–50)
HGB BLD-MCNC: 13.5 G/DL — SIGNIFICANT CHANGE UP (ref 13–17)
INR BLD: 0.93 — SIGNIFICANT CHANGE UP (ref 0.88–1.16)
LYMPHOCYTES # BLD AUTO: 24.9 % — SIGNIFICANT CHANGE UP (ref 13–44)
MCHC RBC-ENTMCNC: 32.3 PG — SIGNIFICANT CHANGE UP (ref 27–34)
MCHC RBC-ENTMCNC: 34.4 G/DL — SIGNIFICANT CHANGE UP (ref 32–36)
MCV RBC AUTO: 93.8 FL — SIGNIFICANT CHANGE UP (ref 80–100)
MONOCYTES NFR BLD AUTO: 5.1 % — SIGNIFICANT CHANGE UP (ref 2–14)
NEUTROPHILS NFR BLD AUTO: 69.1 % — SIGNIFICANT CHANGE UP (ref 43–77)
PLATELET # BLD AUTO: 253 K/UL — SIGNIFICANT CHANGE UP (ref 150–400)
POTASSIUM SERPL-MCNC: 4.2 MMOL/L — SIGNIFICANT CHANGE UP (ref 3.5–5.3)
POTASSIUM SERPL-SCNC: 4.2 MMOL/L — SIGNIFICANT CHANGE UP (ref 3.5–5.3)
PROT SERPL-MCNC: 7.1 G/DL — SIGNIFICANT CHANGE UP (ref 6–8.3)
PROTHROM AB SERPL-ACNC: 10.3 SEC — SIGNIFICANT CHANGE UP (ref 9.8–12.7)
RBC # BLD: 4.18 M/UL — LOW (ref 4.2–5.8)
RBC # FLD: 11.8 % — SIGNIFICANT CHANGE UP (ref 10.3–16.9)
SODIUM SERPL-SCNC: 136 MMOL/L — SIGNIFICANT CHANGE UP (ref 135–145)
WBC # BLD: 7.1 K/UL — SIGNIFICANT CHANGE UP (ref 3.8–10.5)
WBC # FLD AUTO: 7.1 K/UL — SIGNIFICANT CHANGE UP (ref 3.8–10.5)

## 2017-11-16 PROCEDURE — 99285 EMERGENCY DEPT VISIT HI MDM: CPT | Mod: 25

## 2017-11-16 PROCEDURE — 93010 ELECTROCARDIOGRAM REPORT: CPT

## 2017-11-16 PROCEDURE — 70450 CT HEAD/BRAIN W/O DYE: CPT | Mod: 26

## 2017-11-16 RX ORDER — ALPRAZOLAM 0.25 MG
1 TABLET ORAL
Qty: 0 | Refills: 0 | Status: DISCONTINUED | OUTPATIENT
Start: 2017-11-16 | End: 2017-11-20

## 2017-11-16 RX ORDER — MORPHINE SULFATE 50 MG/1
4 CAPSULE, EXTENDED RELEASE ORAL ONCE
Qty: 0 | Refills: 0 | Status: DISCONTINUED | OUTPATIENT
Start: 2017-11-16 | End: 2017-11-16

## 2017-11-16 RX ORDER — METOPROLOL TARTRATE 50 MG
50 TABLET ORAL
Qty: 0 | Refills: 0 | Status: DISCONTINUED | OUTPATIENT
Start: 2017-11-16 | End: 2017-11-20

## 2017-11-16 RX ORDER — HYDROMORPHONE HYDROCHLORIDE 2 MG/ML
4 INJECTION INTRAMUSCULAR; INTRAVENOUS; SUBCUTANEOUS EVERY 4 HOURS
Qty: 0 | Refills: 0 | Status: DISCONTINUED | OUTPATIENT
Start: 2017-11-16 | End: 2017-11-17

## 2017-11-16 RX ORDER — ZOLPIDEM TARTRATE 10 MG/1
5 TABLET ORAL AT BEDTIME
Qty: 0 | Refills: 0 | Status: DISCONTINUED | OUTPATIENT
Start: 2017-11-16 | End: 2017-11-16

## 2017-11-16 RX ORDER — ZOLPIDEM TARTRATE 10 MG/1
5 TABLET ORAL AT BEDTIME
Qty: 0 | Refills: 0 | Status: DISCONTINUED | OUTPATIENT
Start: 2017-11-16 | End: 2017-11-20

## 2017-11-16 RX ORDER — METOCLOPRAMIDE HCL 10 MG
10 TABLET ORAL ONCE
Qty: 0 | Refills: 0 | Status: COMPLETED | OUTPATIENT
Start: 2017-11-16 | End: 2017-11-16

## 2017-11-16 RX ORDER — DOCUSATE SODIUM 100 MG
100 CAPSULE ORAL THREE TIMES A DAY
Qty: 0 | Refills: 0 | Status: DISCONTINUED | OUTPATIENT
Start: 2017-11-16 | End: 2017-11-20

## 2017-11-16 RX ORDER — MAGNESIUM HYDROXIDE 400 MG/1
30 TABLET, CHEWABLE ORAL DAILY
Qty: 0 | Refills: 0 | Status: DISCONTINUED | OUTPATIENT
Start: 2017-11-16 | End: 2017-11-20

## 2017-11-16 RX ORDER — BUPROPION HYDROCHLORIDE 150 MG/1
75 TABLET, EXTENDED RELEASE ORAL DAILY
Qty: 0 | Refills: 0 | Status: DISCONTINUED | OUTPATIENT
Start: 2017-11-16 | End: 2017-11-20

## 2017-11-16 RX ORDER — SENNA PLUS 8.6 MG/1
2 TABLET ORAL AT BEDTIME
Qty: 0 | Refills: 0 | Status: DISCONTINUED | OUTPATIENT
Start: 2017-11-16 | End: 2017-11-20

## 2017-11-16 RX ORDER — INDOMETHACIN 50 MG
25 CAPSULE ORAL THREE TIMES A DAY
Qty: 0 | Refills: 0 | Status: DISCONTINUED | OUTPATIENT
Start: 2017-11-16 | End: 2017-11-20

## 2017-11-16 RX ORDER — METHOCARBAMOL 500 MG/1
500 TABLET, FILM COATED ORAL EVERY 8 HOURS
Qty: 0 | Refills: 0 | Status: DISCONTINUED | OUTPATIENT
Start: 2017-11-16 | End: 2017-11-20

## 2017-11-16 RX ORDER — ACETAMINOPHEN 500 MG
325 TABLET ORAL EVERY 4 HOURS
Qty: 0 | Refills: 0 | Status: DISCONTINUED | OUTPATIENT
Start: 2017-11-16 | End: 2017-11-20

## 2017-11-16 RX ORDER — BUPROPION HYDROCHLORIDE 150 MG/1
150 TABLET, EXTENDED RELEASE ORAL DAILY
Qty: 0 | Refills: 0 | Status: DISCONTINUED | OUTPATIENT
Start: 2017-11-16 | End: 2017-11-16

## 2017-11-16 RX ORDER — HYDROMORPHONE HYDROCHLORIDE 2 MG/ML
2 INJECTION INTRAMUSCULAR; INTRAVENOUS; SUBCUTANEOUS EVERY 4 HOURS
Qty: 0 | Refills: 0 | Status: DISCONTINUED | OUTPATIENT
Start: 2017-11-16 | End: 2017-11-17

## 2017-11-16 RX ADMIN — SENNA PLUS 2 TABLET(S): 8.6 TABLET ORAL at 23:26

## 2017-11-16 RX ADMIN — Medication 50 MILLIGRAM(S): at 17:47

## 2017-11-16 RX ADMIN — HYDROMORPHONE HYDROCHLORIDE 4 MILLIGRAM(S): 2 INJECTION INTRAMUSCULAR; INTRAVENOUS; SUBCUTANEOUS at 23:26

## 2017-11-16 RX ADMIN — Medication 25 MILLIGRAM(S): at 23:26

## 2017-11-16 RX ADMIN — HYDROMORPHONE HYDROCHLORIDE 4 MILLIGRAM(S): 2 INJECTION INTRAMUSCULAR; INTRAVENOUS; SUBCUTANEOUS at 18:45

## 2017-11-16 RX ADMIN — Medication 100 MILLIGRAM(S): at 23:25

## 2017-11-16 RX ADMIN — Medication 1 MILLIGRAM(S): at 17:47

## 2017-11-16 RX ADMIN — HYDROMORPHONE HYDROCHLORIDE 4 MILLIGRAM(S): 2 INJECTION INTRAMUSCULAR; INTRAVENOUS; SUBCUTANEOUS at 17:47

## 2017-11-16 RX ADMIN — MORPHINE SULFATE 4 MILLIGRAM(S): 50 CAPSULE, EXTENDED RELEASE ORAL at 15:07

## 2017-11-16 RX ADMIN — MORPHINE SULFATE 4 MILLIGRAM(S): 50 CAPSULE, EXTENDED RELEASE ORAL at 15:22

## 2017-11-16 RX ADMIN — Medication 10 MILLIGRAM(S): at 15:06

## 2017-11-16 NOTE — ED PROVIDER NOTE - PHYSICAL EXAMINATION
VITAL SIGNS: I have reviewed nursing notes and confirm.  CONSTITUTIONAL: Well-developed; well-nourished; in no acute distress.  SKIN: Agree with RN documentation regarding decubitus evaluation. Remainder of skin exam is warm and dry, no acute rash.  HEAD: Normocephalic; + surgical site L mastoid w/surrounding TTP, mild fluctuance w/out warmth/erythema, no discharge noted from incision site  EYES: PERRL, EOM intact; conjunctiva and sclera clear.  ENT: No nasal discharge; airway clear. TMs intact w/visualized landmarks b/l  NECK: Supple; non tender.  CARD: S1, S2 normal; no murmurs, gallops, or rubs. Regular rate and rhythm.  RESP: No wheezes, rales or rhonchi.  ABD: Normal bowel sounds; soft; non-distended; non-tender; no hepatosplenomegaly.  EXT: Normal ROM. No clubbing, cyanosis or edema.  LYMPH: No acute cervical adenopathy.  NEURO: Alert, oriented. CN 2-12 intact b/l, 5/5 strength all ext no sensory deficits, speech intact, gait intact  PSYCH: Cooperative, appropriate.

## 2017-11-16 NOTE — H&P ADULT - HISTORY OF PRESENT ILLNESS
53 y/o male pmhx craniotomy and resection of left acoustic neuroma last month, recently discharged after observation of wound drainage now presents today reporting incision site drainage and headache. Pt reports these symptoms started 1 day ago. He also describes a postural headache and incision pain. He denies any falls or trauma. Denies any nausea or vomiting. 51 y/o male pmhx craniotomy and resection of left acoustic neuroma last month, recently discharged after observation of wound drainage now presents today reporting incision site drainage and mild headache. Pt reports these symptoms started 1 day ago. He denies any falls or trauma. Denies any nausea or vomiting.

## 2017-11-16 NOTE — ED ADULT NURSE NOTE - OBJECTIVE STATEMENT
Pt came to ED by visiting RN for would drainage and pain. Pt had surgery 2 weeks ago, came back to [facility] for drainage, released several days ago and came back today under direction of PCP for new clear drainage to surgical site to behind left ear. Pt reports pain and tenderness to wound site. Site has clear drainage, slight redness.  Pt denies chest pain, SOB, abd pain, /GI symptoms, Fever, chills, D/N/V.  Pt is alert and oriented x3.

## 2017-11-16 NOTE — ED PROVIDER NOTE - MEDICAL DECISION MAKING DETAILS
+ concern for CSF leak w/recent neurosurgical procedure, evaluated by neurosurg and agree that pt needs admission for possible revision/shunt placement. No evidence of acute infection, no abx at this time. Pain controlled in ED.

## 2017-11-16 NOTE — ED PROVIDER NOTE - OBJECTIVE STATEMENT
51 y/o M w/resection of L acoustic neuroma 2wks ago w/post-op complication of CSF leak, discharged 1 wk ago p/w concerns of another leak as his bandage over the past 24hours has been wet, no purulent discharge, more consistent with CSF. Endorsing also non-positional HA and pain at surgical site 8/10, non-radiating. No trauma. No hemotympanum. 53 y/o M w/resection of L acoustic neuroma 2wks ago w/post-op complication of CSF leak, discharged 1 wk ago p/w concerns of another leak as his bandage over the past 24hours has been wet, no purulent discharge, more consistent with CSF. Endorsing also non-positional HA and pain at surgical site 8/10, non-radiating. No trauma. discharge from ear/bleeding. No dizziness/room spinning or tinnitus.

## 2017-11-16 NOTE — H&P ADULT - NSHPPHYSICALEXAM_GEN_ALL_CORE
A&O x 3, comfortable  EOMI, PERRL  incison site erythematous, some mild swelling noted, with clear slow drainage expressed from one suture.  Nontender to palpation  remainder of neuro exam non focal

## 2017-11-16 NOTE — H&P ADULT - ASSESSMENT
a/p: 53 y/o male with post op wound dehiscence and CSF leak   -will discuss placing LD   -obtain CTH non contrast now  -plan for possible revision tomorrow  -NPO after midnight  -hold asa  -case d/w Dr. Simms a/p: 53 y/o male with post op wound dehiscence neuro intact  -patient seen and examined at the bedside with Dr. Simms; wound site inspected, prior derma bond debrided  -new application of skin glue applied and compression head wrap applied  -if patient is still leaking, will plan for OR revision of wound tomorrow afternoon  -CTH reviewed  -plan for possible revision tomorrow  -NPO after breakfast tomorrow  -hold asa  -case d/w Dr. Simms

## 2017-11-16 NOTE — H&P ADULT - NSHPLABSRESULTS_GEN_ALL_CORE
< from: CT Head No Cont (11.16.17 @ 15:48) >    Status post left suboccipital craniectomy and cranioplasty   with interval increase in extra-axial and extracranial fluid consistent   with a left suboccipital pseudomeningocele.    < end of copied text >

## 2017-11-16 NOTE — ED ADULT TRIAGE NOTE - CHIEF COMPLAINT QUOTE
patient had an acoustic neuroma removed 1 month ago. he states that this is the second time the wound is draining. sent by his surgeon to [facility]. complains of headache. denies fever/ chills. clear fluid drainiage

## 2017-11-17 PROCEDURE — 99232 SBSQ HOSP IP/OBS MODERATE 35: CPT | Mod: GC

## 2017-11-17 RX ORDER — OXYCODONE AND ACETAMINOPHEN 5; 325 MG/1; MG/1
1 TABLET ORAL EVERY 6 HOURS
Qty: 0 | Refills: 0 | Status: DISCONTINUED | OUTPATIENT
Start: 2017-11-17 | End: 2017-11-19

## 2017-11-17 RX ORDER — OXYCODONE AND ACETAMINOPHEN 5; 325 MG/1; MG/1
2 TABLET ORAL EVERY 6 HOURS
Qty: 0 | Refills: 0 | Status: DISCONTINUED | OUTPATIENT
Start: 2017-11-17 | End: 2017-11-19

## 2017-11-17 RX ORDER — ENOXAPARIN SODIUM 100 MG/ML
40 INJECTION SUBCUTANEOUS AT BEDTIME
Qty: 0 | Refills: 0 | Status: DISCONTINUED | OUTPATIENT
Start: 2017-11-17 | End: 2017-11-20

## 2017-11-17 RX ORDER — ACETAMINOPHEN 500 MG
1000 TABLET ORAL ONCE
Qty: 0 | Refills: 0 | Status: COMPLETED | OUTPATIENT
Start: 2017-11-17 | End: 2017-11-17

## 2017-11-17 RX ORDER — SODIUM CHLORIDE 9 MG/ML
1000 INJECTION INTRAMUSCULAR; INTRAVENOUS; SUBCUTANEOUS
Qty: 0 | Refills: 0 | Status: DISCONTINUED | OUTPATIENT
Start: 2017-11-17 | End: 2017-11-17

## 2017-11-17 RX ADMIN — Medication 50 MILLIGRAM(S): at 17:57

## 2017-11-17 RX ADMIN — Medication 25 MILLIGRAM(S): at 17:57

## 2017-11-17 RX ADMIN — Medication 100 MILLIGRAM(S): at 06:43

## 2017-11-17 RX ADMIN — Medication 100 MILLIGRAM(S): at 17:57

## 2017-11-17 RX ADMIN — BUPROPION HYDROCHLORIDE 75 MILLIGRAM(S): 150 TABLET, EXTENDED RELEASE ORAL at 06:43

## 2017-11-17 RX ADMIN — Medication 25 MILLIGRAM(S): at 18:30

## 2017-11-17 RX ADMIN — OXYCODONE AND ACETAMINOPHEN 2 TABLET(S): 5; 325 TABLET ORAL at 06:43

## 2017-11-17 RX ADMIN — Medication 1 MILLIGRAM(S): at 17:59

## 2017-11-17 RX ADMIN — METHOCARBAMOL 500 MILLIGRAM(S): 500 TABLET, FILM COATED ORAL at 21:44

## 2017-11-17 RX ADMIN — OXYCODONE AND ACETAMINOPHEN 2 TABLET(S): 5; 325 TABLET ORAL at 07:38

## 2017-11-17 RX ADMIN — HYDROMORPHONE HYDROCHLORIDE 4 MILLIGRAM(S): 2 INJECTION INTRAMUSCULAR; INTRAVENOUS; SUBCUTANEOUS at 00:26

## 2017-11-17 RX ADMIN — OXYCODONE AND ACETAMINOPHEN 2 TABLET(S): 5; 325 TABLET ORAL at 16:38

## 2017-11-17 RX ADMIN — Medication 100 MILLIGRAM(S): at 21:44

## 2017-11-17 RX ADMIN — METHOCARBAMOL 500 MILLIGRAM(S): 500 TABLET, FILM COATED ORAL at 03:31

## 2017-11-17 RX ADMIN — Medication 25 MILLIGRAM(S): at 07:38

## 2017-11-17 RX ADMIN — Medication 50 MILLIGRAM(S): at 06:43

## 2017-11-17 RX ADMIN — SENNA PLUS 2 TABLET(S): 8.6 TABLET ORAL at 21:44

## 2017-11-17 RX ADMIN — Medication 1 MILLIGRAM(S): at 06:43

## 2017-11-17 RX ADMIN — ENOXAPARIN SODIUM 40 MILLIGRAM(S): 100 INJECTION SUBCUTANEOUS at 21:44

## 2017-11-17 RX ADMIN — SODIUM CHLORIDE 80 MILLILITER(S): 9 INJECTION INTRAMUSCULAR; INTRAVENOUS; SUBCUTANEOUS at 10:29

## 2017-11-17 RX ADMIN — Medication 25 MILLIGRAM(S): at 21:44

## 2017-11-17 RX ADMIN — ZOLPIDEM TARTRATE 5 MILLIGRAM(S): 10 TABLET ORAL at 00:27

## 2017-11-17 RX ADMIN — MAGNESIUM HYDROXIDE 30 MILLILITER(S): 400 TABLET, CHEWABLE ORAL at 06:44

## 2017-11-17 RX ADMIN — Medication 25 MILLIGRAM(S): at 22:44

## 2017-11-17 RX ADMIN — OXYCODONE AND ACETAMINOPHEN 2 TABLET(S): 5; 325 TABLET ORAL at 17:40

## 2017-11-17 RX ADMIN — Medication 25 MILLIGRAM(S): at 00:26

## 2017-11-17 RX ADMIN — Medication 25 MILLIGRAM(S): at 06:43

## 2017-11-17 NOTE — PROGRESS NOTE ADULT - SUBJECTIVE AND OBJECTIVE BOX
Pre-op note  Dx: Wound leak  Surgery: wound revision washout  Surgeon: Dr. Simms    Labs                        13.5   7.1   )-----------( 253      ( 16 Nov 2017 15:04 )             39.2   11-16    136  |  97  |  18  ----------------------------<  97  4.2   |  25  |  0.89    Ca    9.1      16 Nov 2017 15:04    TPro  7.1  /  Alb  4.6  /  TBili  0.8  /  DBili  x   /  AST  17  /  ALT  27  /  AlkPhos  34<L>  11-16    PT/INR - ( 16 Nov 2017 15:04 )   PT: 10.3 sec;   INR: 0.93          PTT - ( 16 Nov 2017 15:04 )  PTT:22.6 sec      - NPO, IVF  - Consent to be obtained in AM by Dr. Simms  - CXR- no acute pathology seen  - Dr. Dutta - Medical consult for the pre-op clearance  - EKG - pending, ordered  - on Addon schedule  - D/w Dr. Simms

## 2017-11-17 NOTE — PHYSICAL THERAPY INITIAL EVALUATION ADULT - PERTINENT HX OF CURRENT PROBLEM, REHAB EVAL
51 y/o male pmhx craniotomy and resection of left acoustic neuroma last month, recently discharged after observation of wound drainage now presents today reporting incision site drainage and mild headache.

## 2017-11-17 NOTE — PROGRESS NOTE ADULT - SUBJECTIVE AND OBJECTIVE BOX
Interval Events: reviewed  Patient seen and examined at bedside.    Patient is a 52y old  Male who presents with a chief complaint of resection of cerebellopontine angle lesion, left (16 Nov 2017 23:18)  he rejected the sutures and had t come in .  He is doing better    PAST MEDICAL & SURGICAL HISTORY:  Anxiety disorder  ADHD (attention deficit hyperactivity disorder)  HTN (hypertension)  Acoustic neuroma  S/P excision of acoustic neuroma      MEDICATIONS:  Pulmonary:    Antimicrobials:    Anticoagulants:  enoxaparin Injectable 40 milliGRAM(s) SubCutaneous at bedtime    Cardiac:  metoprolol     tartrate 50 milliGRAM(s) Oral two times a day      Allergies    No Known Allergies    Intolerances        Vital Signs Last 24 Hrs  T(C): 36.7 (18 Nov 2017 17:39), Max: 36.9 (18 Nov 2017 09:58)  T(F): 98 (18 Nov 2017 17:39), Max: 98.5 (18 Nov 2017 09:58)  HR: 67 (18 Nov 2017 17:39) (62 - 67)  BP: 100/67 (18 Nov 2017 17:39) (95/64 - 109/66)  BP(mean): --  RR: 16 (18 Nov 2017 17:39) (15 - 18)  SpO2: 99% (18 Nov 2017 17:39) (97% - 99%)    11-17 @ 07:01  -  11-18 @ 07:00  --------------------------------------------------------  IN: 480 mL / OUT: 0 mL / NET: 480 mL    11-18 @ 07:01  -  11-18 @ 17:57  --------------------------------------------------------  IN: 240 mL / OUT: 0 mL / NET: 240 mL          LABS:      CBC Full  -  ( 18 Nov 2017 07:09 )  WBC Count : 6.0 K/uL  Hemoglobin : 12.7 g/dL  Hematocrit : 37.4 %  Platelet Count - Automated : 194 K/uL  Mean Cell Volume : 95.2 fL  Mean Cell Hemoglobin : 32.3 pg  Mean Cell Hemoglobin Concentration : 34.0 g/dL  Auto Neutrophil # : x  Auto Lymphocyte # : x  Auto Monocyte # : x  Auto Eosinophil # : x  Auto Basophil # : x  Auto Neutrophil % : x  Auto Lymphocyte % : x  Auto Monocyte % : x  Auto Eosinophil % : x  Auto Basophil % : x    11-18    138  |  97  |  21  ----------------------------<  92  5.1   |  29  |  1.26    Ca    9.5      18 Nov 2017 07:09                          RADIOLOGY & ADDITIONAL STUDIES (The following images were personally reviewed):  Pressley:                                No  Urine output:               Yes          DVT prophylaxis:         Yes       Flattus:                          Yes        Bowel movement:       Yes

## 2017-11-17 NOTE — PROGRESS NOTE ADULT - SUBJECTIVE AND OBJECTIVE BOX
HPI:  53 y/o male pmhx craniotomy and resection of left acoustic neuroma last month, recently discharged after observation of wound drainage now presents today reporting incision site drainage and mild headache. Pt reports these symptoms started 1 day ago. He denies any falls or trauma. Denies any nausea or vomiting. (16 Nov 2017 14:59)    OVERNIGHT EVENTS: Pt admitted yesterday for scalp wound site leakage. Pt states associated headache similar to headache after acoustic neuroma resection. Pt denies acute changes in vision, acute weakness or loss of sensation of extremities.  Vital Signs Last 24 Hrs  T(C): 36.2 (17 Nov 2017 09:27), Max: 36.7 (17 Nov 2017 05:32)  T(F): 97.2 (17 Nov 2017 09:27), Max: 98.1 (17 Nov 2017 05:32)  HR: 66 (17 Nov 2017 09:27) (62 - 69)  BP: 115/71 (17 Nov 2017 09:27) (114/75 - 156/85)  BP(mean): --  RR: 16 (17 Nov 2017 09:27) (16 - 18)  SpO2: 97% (17 Nov 2017 09:27) (96% - 98%)    I&O's Summary    16 Nov 2017 07:01  -  17 Nov 2017 07:00  --------------------------------------------------------  IN: 350 mL / OUT: 0 mL / NET: 350 mL    17 Nov 2017 07:01  -  17 Nov 2017 12:37  --------------------------------------------------------  IN: 80 mL / OUT: 0 mL / NET: 80 mL        PHYSICAL EXAM:  Neurological:    Motor exam:         [] Upper extremity              Bi(c5)  WE(c6)  EE(c7)   FF(c8)                                                R         5/5        5/5        5/5       5/5                                               L          5/5        5/5        5/5       5/5         [] Lower extremeity          HF(l2)   KE(l3)    TA(l4)   EHL(l5)  GS(s1)                                                 R        5/5        5/5        5/5       5/5         5/5                                               L         5/5        5/5       5/5       5/5          5/5                                                        [] warm well perfused; capillary refill <3 seconds     Sensation: [] intact to light touch  [] decreased:       Cardiovascular:  Respiratory:  Gastrointestinal:  Genitourinary:  Extremities:  Incision/Wound:    TUBES/LINES:  [] Pressley  [] Lumbar Drain  [] Wound Drains  [] Others      DIET:  [] NPO  [] Mechanical  [] Tube feeds    LABS:                        13.5   7.1   )-----------( 253      ( 16 Nov 2017 15:04 )             39.2     11-16    136  |  97  |  18  ----------------------------<  97  4.2   |  25  |  0.89    Ca    9.1      16 Nov 2017 15:04    TPro  7.1  /  Alb  4.6  /  TBili  0.8  /  DBili  x   /  AST  17  /  ALT  27  /  AlkPhos  34<L>  11-16    PT/INR - ( 16 Nov 2017 15:04 )   PT: 10.3 sec;   INR: 0.93          PTT - ( 16 Nov 2017 15:04 )  PTT:22.6 sec        CAPILLARY BLOOD GLUCOSE          Drug Levels: [] N/A    CSF Analysis: [] N/A      Allergies    No Known Allergies    Intolerances      MEDICATIONS:  Antibiotics:    Neuro:  acetaminophen   Tablet. 325 milliGRAM(s) Oral every 4 hours PRN  acetaminophen  IVPB. 1000 milliGRAM(s) IV Intermittent once  ALPRAZolam 1 milliGRAM(s) Oral two times a day  buPROPion . 75 milliGRAM(s) Oral daily  indomethacin 25 milliGRAM(s) Oral three times a day  methocarbamol 500 milliGRAM(s) Oral every 8 hours PRN  oxyCODONE    5 mG/acetaminophen 325 mG 1 Tablet(s) Oral every 6 hours PRN  oxyCODONE    5 mG/acetaminophen 325 mG 2 Tablet(s) Oral every 6 hours PRN  zolpidem 5 milliGRAM(s) Oral at bedtime PRN  zolpidem 5 milliGRAM(s) Oral at bedtime PRN    Anticoagulation:    OTHER:  docusate sodium 100 milliGRAM(s) Oral three times a day  magnesium hydroxide Suspension 30 milliLiter(s) Oral daily PRN  metoprolol     tartrate 50 milliGRAM(s) Oral two times a day  senna 2 Tablet(s) Oral at bedtime PRN    IVF:    CULTURES:  Culture Results:   No growth at 5 days. (11-05 @ 00:22)  Culture Results:   No growth at 1 week. (11-04 @ 06:56)    RADIOLOGY & ADDITIONAL TESTS:      ASSESSMENT:  52y Male s/p    CSF LEAK  No h/o HF  No pertinent family history in first degree relatives  Handoff  MEWS Score  Anxiety disorder  ADHD (attention deficit hyperactivity disorder)  HTN (hypertension)  Acoustic neuroma  CSF leak  S/P excision of acoustic neuroma  No significant past surgical history  WOUND CHECK  7      PLAN:  NEURO:    CARDIOVASCULAR:    PULMONARY:    RENAL:    GI:    HEME:    ID:    ENDO:    DVT PROPHYLAXIS:  [] Venodynes                                [] Heparin/Lovenox    DISPOSITION: HPI:  51 y/o male pmhx craniotomy and resection of left acoustic neuroma last month, recently discharged after observation of wound drainage now presents today reporting incision site drainage and mild headache. Pt reports these symptoms started 1 day ago. He denies any falls or trauma. Denies any nausea or vomiting. (16 Nov 2017 14:59)    OVERNIGHT EVENTS: Pt admitted yesterday for scalp wound site leakage. Pt states associated headache similar to headache after acoustic neuroma resection. Tight compressive head dressing was applied. No further leakage noted this morning. Pt denies acute changes in vision, acute weakness or loss of sensation of extremities.    Vital Signs Last 24 Hrs  T(C): 36.2 (17 Nov 2017 09:27), Max: 36.7 (17 Nov 2017 05:32)  T(F): 97.2 (17 Nov 2017 09:27), Max: 98.1 (17 Nov 2017 05:32)  HR: 66 (17 Nov 2017 09:27) (62 - 69)  BP: 115/71 (17 Nov 2017 09:27) (114/75 - 156/85)  BP(mean): --  RR: 16 (17 Nov 2017 09:27) (16 - 18)  SpO2: 97% (17 Nov 2017 09:27) (96% - 98%)    I&O's Summary    16 Nov 2017 07:01  -  17 Nov 2017 07:00  --------------------------------------------------------  IN: 350 mL / OUT: 0 mL / NET: 350 mL    17 Nov 2017 07:01  -  17 Nov 2017 12:37  --------------------------------------------------------  IN: 80 mL / OUT: 0 mL / NET: 80 mL    PHYSICAL EXAM:  Neurological: AAOx3, FC, speech coherent  CNII-XII: EOM intact, PERRL, face symmetric, tongue midline  Motor: MAEx4 5/5 UE and LE B/L  SILT throughout         [x] warm well perfused; capillary refill <3 seconds   Incision/Wound: Tight compressive head dressing intact, dry, no drainage noted     TUBES/LINES:  [] Pressley  [] Lumbar Drain  [] Wound Drains  [] Others    DIET:  [] NPO  [x] Mechanical  [] Tube feeds    LABS:                        13.5   7.1   )-----------( 253      ( 16 Nov 2017 15:04 )             39.2     11-16    136  |  97  |  18  ----------------------------<  97  4.2   |  25  |  0.89    Ca    9.1      16 Nov 2017 15:04    TPro  7.1  /  Alb  4.6  /  TBili  0.8  /  DBili  x   /  AST  17  /  ALT  27  /  AlkPhos  34<L>  11-16    PT/INR - ( 16 Nov 2017 15:04 )   PT: 10.3 sec;   INR: 0.93          PTT - ( 16 Nov 2017 15:04 )  PTT:22.6 sec        CAPILLARY BLOOD GLUCOSE          Drug Levels: [] N/A    CSF Analysis: [] N/A      Allergies    No Known Allergies    Intolerances      MEDICATIONS:  Antibiotics:    Neuro:  acetaminophen   Tablet. 325 milliGRAM(s) Oral every 4 hours PRN  acetaminophen  IVPB. 1000 milliGRAM(s) IV Intermittent once  ALPRAZolam 1 milliGRAM(s) Oral two times a day  buPROPion . 75 milliGRAM(s) Oral daily  indomethacin 25 milliGRAM(s) Oral three times a day  methocarbamol 500 milliGRAM(s) Oral every 8 hours PRN  oxyCODONE    5 mG/acetaminophen 325 mG 1 Tablet(s) Oral every 6 hours PRN  oxyCODONE    5 mG/acetaminophen 325 mG 2 Tablet(s) Oral every 6 hours PRN  zolpidem 5 milliGRAM(s) Oral at bedtime PRN  zolpidem 5 milliGRAM(s) Oral at bedtime PRN    Anticoagulation:    OTHER:  docusate sodium 100 milliGRAM(s) Oral three times a day  magnesium hydroxide Suspension 30 milliLiter(s) Oral daily PRN  metoprolol     tartrate 50 milliGRAM(s) Oral two times a day  senna 2 Tablet(s) Oral at bedtime PRN    IVF:    CULTURES:  Culture Results:   No growth at 5 days. (11-05 @ 00:22)  Culture Results:   No growth at 1 week. (11-04 @ 06:56)    RADIOLOGY & ADDITIONAL TESTS:    ASSESSMENT:  52y Male with scalp wound dehiscence, s/p left acoustic neuroma resection     CSF LEAK  No h/o HF  No pertinent family history in first degree relatives  Handoff  MEWS Score  Anxiety disorder  ADHD (attention deficit hyperactivity disorder)  HTN (hypertension)  Acoustic neuroma  CSF leak  S/P excision of acoustic neuroma  No significant past surgical history  WOUND CHECK  7    PLAN:  -pain control  -DVT prophylaxis: SCDS, SQ lovenox  -PT/OOB  -tight compression head wrap, assess for leakage  -continue home medications  -dispo pending  -D/w Dr. Simms

## 2017-11-18 DIAGNOSIS — D33.3 BENIGN NEOPLASM OF CRANIAL NERVES: ICD-10-CM

## 2017-11-18 DIAGNOSIS — G96.0 CEREBROSPINAL FLUID LEAK: ICD-10-CM

## 2017-11-18 DIAGNOSIS — I10 ESSENTIAL (PRIMARY) HYPERTENSION: ICD-10-CM

## 2017-11-18 LAB
ANION GAP SERPL CALC-SCNC: 12 MMOL/L — SIGNIFICANT CHANGE UP (ref 5–17)
BUN SERPL-MCNC: 21 MG/DL — SIGNIFICANT CHANGE UP (ref 7–23)
CALCIUM SERPL-MCNC: 9.5 MG/DL — SIGNIFICANT CHANGE UP (ref 8.4–10.5)
CHLORIDE SERPL-SCNC: 97 MMOL/L — SIGNIFICANT CHANGE UP (ref 96–108)
CO2 SERPL-SCNC: 29 MMOL/L — SIGNIFICANT CHANGE UP (ref 22–31)
CREAT SERPL-MCNC: 1.26 MG/DL — SIGNIFICANT CHANGE UP (ref 0.5–1.3)
GLUCOSE SERPL-MCNC: 92 MG/DL — SIGNIFICANT CHANGE UP (ref 70–99)
HCT VFR BLD CALC: 37.4 % — LOW (ref 39–50)
HGB BLD-MCNC: 12.7 G/DL — LOW (ref 13–17)
MCHC RBC-ENTMCNC: 32.3 PG — SIGNIFICANT CHANGE UP (ref 27–34)
MCHC RBC-ENTMCNC: 34 G/DL — SIGNIFICANT CHANGE UP (ref 32–36)
MCV RBC AUTO: 95.2 FL — SIGNIFICANT CHANGE UP (ref 80–100)
PLATELET # BLD AUTO: 194 K/UL — SIGNIFICANT CHANGE UP (ref 150–400)
POTASSIUM SERPL-MCNC: 5.1 MMOL/L — SIGNIFICANT CHANGE UP (ref 3.5–5.3)
POTASSIUM SERPL-SCNC: 5.1 MMOL/L — SIGNIFICANT CHANGE UP (ref 3.5–5.3)
RBC # BLD: 3.93 M/UL — LOW (ref 4.2–5.8)
RBC # FLD: 11.9 % — SIGNIFICANT CHANGE UP (ref 10.3–16.9)
SODIUM SERPL-SCNC: 138 MMOL/L — SIGNIFICANT CHANGE UP (ref 135–145)
WBC # BLD: 6 K/UL — SIGNIFICANT CHANGE UP (ref 3.8–10.5)
WBC # FLD AUTO: 6 K/UL — SIGNIFICANT CHANGE UP (ref 3.8–10.5)

## 2017-11-18 RX ADMIN — METHOCARBAMOL 500 MILLIGRAM(S): 500 TABLET, FILM COATED ORAL at 22:13

## 2017-11-18 RX ADMIN — OXYCODONE AND ACETAMINOPHEN 2 TABLET(S): 5; 325 TABLET ORAL at 13:55

## 2017-11-18 RX ADMIN — BUPROPION HYDROCHLORIDE 75 MILLIGRAM(S): 150 TABLET, EXTENDED RELEASE ORAL at 06:41

## 2017-11-18 RX ADMIN — ZOLPIDEM TARTRATE 5 MILLIGRAM(S): 10 TABLET ORAL at 02:30

## 2017-11-18 RX ADMIN — OXYCODONE AND ACETAMINOPHEN 2 TABLET(S): 5; 325 TABLET ORAL at 01:07

## 2017-11-18 RX ADMIN — OXYCODONE AND ACETAMINOPHEN 2 TABLET(S): 5; 325 TABLET ORAL at 23:14

## 2017-11-18 RX ADMIN — Medication 100 MILLIGRAM(S): at 13:01

## 2017-11-18 RX ADMIN — Medication 25 MILLIGRAM(S): at 06:41

## 2017-11-18 RX ADMIN — Medication 100 MILLIGRAM(S): at 06:41

## 2017-11-18 RX ADMIN — Medication 100 MILLIGRAM(S): at 21:54

## 2017-11-18 RX ADMIN — Medication 50 MILLIGRAM(S): at 06:41

## 2017-11-18 RX ADMIN — Medication 1 MILLIGRAM(S): at 06:41

## 2017-11-18 RX ADMIN — MAGNESIUM HYDROXIDE 30 MILLILITER(S): 400 TABLET, CHEWABLE ORAL at 06:41

## 2017-11-18 RX ADMIN — Medication 25 MILLIGRAM(S): at 14:21

## 2017-11-18 RX ADMIN — Medication 25 MILLIGRAM(S): at 07:11

## 2017-11-18 RX ADMIN — OXYCODONE AND ACETAMINOPHEN 2 TABLET(S): 5; 325 TABLET ORAL at 00:07

## 2017-11-18 RX ADMIN — OXYCODONE AND ACETAMINOPHEN 2 TABLET(S): 5; 325 TABLET ORAL at 12:55

## 2017-11-18 RX ADMIN — Medication 25 MILLIGRAM(S): at 22:54

## 2017-11-18 RX ADMIN — ENOXAPARIN SODIUM 40 MILLIGRAM(S): 100 INJECTION SUBCUTANEOUS at 21:54

## 2017-11-18 RX ADMIN — OXYCODONE AND ACETAMINOPHEN 2 TABLET(S): 5; 325 TABLET ORAL at 22:14

## 2017-11-18 RX ADMIN — Medication 25 MILLIGRAM(S): at 13:01

## 2017-11-18 RX ADMIN — Medication 1 MILLIGRAM(S): at 17:40

## 2017-11-18 RX ADMIN — Medication 25 MILLIGRAM(S): at 21:54

## 2017-11-18 RX ADMIN — Medication 50 MILLIGRAM(S): at 17:40

## 2017-11-18 NOTE — PROGRESS NOTE ADULT - SUBJECTIVE AND OBJECTIVE BOX
HPI:  53 y/o male pmhx craniotomy and resection of left acoustic neuroma last month, recently discharged after observation of wound drainage now presents today reporting incision site drainage and mild headache. Pt reports these symptoms started 1 day ago. He denies any falls or trauma. Denies any nausea or vomiting. (16 Nov 2017 14:59)    OVERNIGHT EVENTS:  No acute events overnight. No leaking from scalp noted      Vital Signs Last 24 Hrs  T(C): 36.8 (18 Nov 2017 05:38), Max: 36.8 (18 Nov 2017 05:38)  T(F): 98.3 (18 Nov 2017 05:38), Max: 98.3 (18 Nov 2017 05:38)  HR: 62 (18 Nov 2017 05:38) (62 - 73)  BP: 101/66 (18 Nov 2017 05:38) (101/66 - 115/71)  BP(mean): --  RR: 16 (18 Nov 2017 05:38) (15 - 16)  SpO2: 97% (18 Nov 2017 05:38) (97% - 99%)    I&O's Summary    16 Nov 2017 07:01  -  17 Nov 2017 07:00  --------------------------------------------------------  IN: 350 mL / OUT: 0 mL / NET: 350 mL    17 Nov 2017 07:01  -  18 Nov 2017 05:51  --------------------------------------------------------  IN: 380 mL / OUT: 0 mL / NET: 380 mL        PHYSICAL EXAM:  Neurological:  AAOx3, NAD, coherent speech, FC  CNII-XII grossly intact, PERRL, EOMI  MAEx4, strength 5/5 UE and LE b/l, no drift  SILT throughout b/l  Incision/wound: tight compressive head dressing in place- clean and dry- no drainage noted       TUBES/LINES:  [] Pressley  [] Lumbar Drain  [] Wound Drains  [] Others      DIET:  [] NPO  [] Mechanical  [] Tube feeds    LABS:                        13.5   7.1   )-----------( 253      ( 16 Nov 2017 15:04 )             39.2     11-16    136  |  97  |  18  ----------------------------<  97  4.2   |  25  |  0.89    Ca    9.1      16 Nov 2017 15:04    TPro  7.1  /  Alb  4.6  /  TBili  0.8  /  DBili  x   /  AST  17  /  ALT  27  /  AlkPhos  34<L>  11-16    PT/INR - ( 16 Nov 2017 15:04 )   PT: 10.3 sec;   INR: 0.93          PTT - ( 16 Nov 2017 15:04 )  PTT:22.6 sec        CAPILLARY BLOOD GLUCOSE          Drug Levels: [] N/A    CSF Analysis: [] N/A      Allergies    No Known Allergies    Intolerances      MEDICATIONS:  Antibiotics:    Neuro:  acetaminophen   Tablet. 325 milliGRAM(s) Oral every 4 hours PRN  ALPRAZolam 1 milliGRAM(s) Oral two times a day  buPROPion . 75 milliGRAM(s) Oral daily  indomethacin 25 milliGRAM(s) Oral three times a day  methocarbamol 500 milliGRAM(s) Oral every 8 hours PRN  oxyCODONE    5 mG/acetaminophen 325 mG 1 Tablet(s) Oral every 6 hours PRN  oxyCODONE    5 mG/acetaminophen 325 mG 2 Tablet(s) Oral every 6 hours PRN  zolpidem 5 milliGRAM(s) Oral at bedtime PRN  zolpidem 5 milliGRAM(s) Oral at bedtime PRN    Anticoagulation:  enoxaparin Injectable 40 milliGRAM(s) SubCutaneous at bedtime    OTHER:  docusate sodium 100 milliGRAM(s) Oral three times a day  magnesium hydroxide Suspension 30 milliLiter(s) Oral daily PRN  metoprolol     tartrate 50 milliGRAM(s) Oral two times a day  senna 2 Tablet(s) Oral at bedtime PRN    IVF:    CULTURES:  Culture Results:   No growth at 5 days. (11-05 @ 00:22)  Culture Results:   No growth at 1 week. (11-04 @ 06:56)    RADIOLOGY & ADDITIONAL TESTS:      ASSESSMENT:  52y Male with scalp wound dehiscence, s/p left acoustic neuroma resection    CSF LEAK  No h/o HF  No pertinent family history in first degree relatives  Handoff  MEWS Score  Anxiety disorder  ADHD (attention deficit hyperactivity disorder)  HTN (hypertension)  Acoustic neuroma  CSF leak  S/P excision of acoustic neuroma  No significant past surgical history  WOUND CHECK  7      PLAN:  -neuro checks  -pain control  -monitor head dressing for signs of leakage  -regular diet  -bowel regimen  -GI/DVT ppx  -OOB/PT/OT  -d/w Dr. Simms

## 2017-11-19 LAB
ANION GAP SERPL CALC-SCNC: 8 MMOL/L — SIGNIFICANT CHANGE UP (ref 5–17)
BUN SERPL-MCNC: 22 MG/DL — SIGNIFICANT CHANGE UP (ref 7–23)
CALCIUM SERPL-MCNC: 8.9 MG/DL — SIGNIFICANT CHANGE UP (ref 8.4–10.5)
CHLORIDE SERPL-SCNC: 96 MMOL/L — SIGNIFICANT CHANGE UP (ref 96–108)
CO2 SERPL-SCNC: 29 MMOL/L — SIGNIFICANT CHANGE UP (ref 22–31)
CREAT SERPL-MCNC: 1.07 MG/DL — SIGNIFICANT CHANGE UP (ref 0.5–1.3)
GLUCOSE SERPL-MCNC: 100 MG/DL — HIGH (ref 70–99)
HCT VFR BLD CALC: 36.4 % — LOW (ref 39–50)
HGB BLD-MCNC: 12.2 G/DL — LOW (ref 13–17)
MCHC RBC-ENTMCNC: 31.9 PG — SIGNIFICANT CHANGE UP (ref 27–34)
MCHC RBC-ENTMCNC: 33.5 G/DL — SIGNIFICANT CHANGE UP (ref 32–36)
MCV RBC AUTO: 95.3 FL — SIGNIFICANT CHANGE UP (ref 80–100)
PLATELET # BLD AUTO: 189 K/UL — SIGNIFICANT CHANGE UP (ref 150–400)
POTASSIUM SERPL-MCNC: 4.4 MMOL/L — SIGNIFICANT CHANGE UP (ref 3.5–5.3)
POTASSIUM SERPL-SCNC: 4.4 MMOL/L — SIGNIFICANT CHANGE UP (ref 3.5–5.3)
RBC # BLD: 3.82 M/UL — LOW (ref 4.2–5.8)
RBC # FLD: 12.3 % — SIGNIFICANT CHANGE UP (ref 10.3–16.9)
SODIUM SERPL-SCNC: 133 MMOL/L — LOW (ref 135–145)
WBC # BLD: 6.1 K/UL — SIGNIFICANT CHANGE UP (ref 3.8–10.5)
WBC # FLD AUTO: 6.1 K/UL — SIGNIFICANT CHANGE UP (ref 3.8–10.5)

## 2017-11-19 RX ORDER — ACETAMINOPHEN 500 MG
1000 TABLET ORAL ONCE
Qty: 0 | Refills: 0 | Status: COMPLETED | OUTPATIENT
Start: 2017-11-19 | End: 2017-11-19

## 2017-11-19 RX ORDER — OXYCODONE AND ACETAMINOPHEN 5; 325 MG/1; MG/1
1 TABLET ORAL EVERY 4 HOURS
Qty: 0 | Refills: 0 | Status: DISCONTINUED | OUTPATIENT
Start: 2017-11-19 | End: 2017-11-20

## 2017-11-19 RX ORDER — DIPHENHYDRAMINE HCL 50 MG
25 CAPSULE ORAL EVERY 4 HOURS
Qty: 0 | Refills: 0 | Status: DISCONTINUED | OUTPATIENT
Start: 2017-11-19 | End: 2017-11-20

## 2017-11-19 RX ORDER — OXYCODONE AND ACETAMINOPHEN 5; 325 MG/1; MG/1
2 TABLET ORAL EVERY 4 HOURS
Qty: 0 | Refills: 0 | Status: DISCONTINUED | OUTPATIENT
Start: 2017-11-19 | End: 2017-11-20

## 2017-11-19 RX ADMIN — Medication 50 MILLIGRAM(S): at 05:57

## 2017-11-19 RX ADMIN — Medication 25 MILLIGRAM(S): at 22:24

## 2017-11-19 RX ADMIN — Medication 100 MILLIGRAM(S): at 21:24

## 2017-11-19 RX ADMIN — BUPROPION HYDROCHLORIDE 75 MILLIGRAM(S): 150 TABLET, EXTENDED RELEASE ORAL at 05:57

## 2017-11-19 RX ADMIN — Medication 100 MILLIGRAM(S): at 05:57

## 2017-11-19 RX ADMIN — Medication 25 MILLIGRAM(S): at 06:57

## 2017-11-19 RX ADMIN — SENNA PLUS 2 TABLET(S): 8.6 TABLET ORAL at 21:25

## 2017-11-19 RX ADMIN — OXYCODONE AND ACETAMINOPHEN 2 TABLET(S): 5; 325 TABLET ORAL at 14:20

## 2017-11-19 RX ADMIN — OXYCODONE AND ACETAMINOPHEN 2 TABLET(S): 5; 325 TABLET ORAL at 19:07

## 2017-11-19 RX ADMIN — Medication 25 MILLIGRAM(S): at 13:30

## 2017-11-19 RX ADMIN — OXYCODONE AND ACETAMINOPHEN 2 TABLET(S): 5; 325 TABLET ORAL at 02:45

## 2017-11-19 RX ADMIN — Medication 25 MILLIGRAM(S): at 05:57

## 2017-11-19 RX ADMIN — ZOLPIDEM TARTRATE 5 MILLIGRAM(S): 10 TABLET ORAL at 02:14

## 2017-11-19 RX ADMIN — ZOLPIDEM TARTRATE 5 MILLIGRAM(S): 10 TABLET ORAL at 00:00

## 2017-11-19 RX ADMIN — Medication 25 MILLIGRAM(S): at 21:24

## 2017-11-19 RX ADMIN — Medication 25 MILLIGRAM(S): at 13:10

## 2017-11-19 RX ADMIN — Medication 25 MILLIGRAM(S): at 22:18

## 2017-11-19 RX ADMIN — Medication 1 MILLIGRAM(S): at 05:57

## 2017-11-19 RX ADMIN — MAGNESIUM HYDROXIDE 30 MILLILITER(S): 400 TABLET, CHEWABLE ORAL at 21:24

## 2017-11-19 RX ADMIN — OXYCODONE AND ACETAMINOPHEN 2 TABLET(S): 5; 325 TABLET ORAL at 03:30

## 2017-11-19 RX ADMIN — ZOLPIDEM TARTRATE 5 MILLIGRAM(S): 10 TABLET ORAL at 23:00

## 2017-11-19 RX ADMIN — Medication 1 MILLIGRAM(S): at 17:49

## 2017-11-19 RX ADMIN — OXYCODONE AND ACETAMINOPHEN 2 TABLET(S): 5; 325 TABLET ORAL at 10:15

## 2017-11-19 RX ADMIN — Medication 100 MILLIGRAM(S): at 13:10

## 2017-11-19 RX ADMIN — OXYCODONE AND ACETAMINOPHEN 2 TABLET(S): 5; 325 TABLET ORAL at 09:25

## 2017-11-19 RX ADMIN — Medication 1000 MILLIGRAM(S): at 07:20

## 2017-11-19 RX ADMIN — METHOCARBAMOL 500 MILLIGRAM(S): 500 TABLET, FILM COATED ORAL at 21:24

## 2017-11-19 RX ADMIN — ENOXAPARIN SODIUM 40 MILLIGRAM(S): 100 INJECTION SUBCUTANEOUS at 21:24

## 2017-11-19 RX ADMIN — OXYCODONE AND ACETAMINOPHEN 2 TABLET(S): 5; 325 TABLET ORAL at 13:40

## 2017-11-19 RX ADMIN — OXYCODONE AND ACETAMINOPHEN 2 TABLET(S): 5; 325 TABLET ORAL at 19:45

## 2017-11-19 RX ADMIN — Medication 400 MILLIGRAM(S): at 07:04

## 2017-11-19 NOTE — PROGRESS NOTE ADULT - SUBJECTIVE AND OBJECTIVE BOX
HPI:  51 y/o male pmhx craniotomy and resection of left acoustic neuroma last month, recently discharged after observation of wound drainage now presents today reporting incision site drainage and mild headache. Pt reports these symptoms started 1 day ago. He denies any falls or trauma. Denies any nausea or vomiting. (16 Nov 2017 14:59)    OVERNIGHT EVENTS: No acute events overnight. Scalp dressing dry. Pt states mild headache. Denies nausea, chills, acute weakness/loss of sensation of extremities.    Vital Signs Last 24 Hrs  T(C): 36.5 (19 Nov 2017 06:02), Max: 36.9 (18 Nov 2017 09:58)  T(F): 97.7 (19 Nov 2017 06:02), Max: 98.5 (18 Nov 2017 09:58)  HR: 69 (19 Nov 2017 06:02) (65 - 78)  BP: 113/78 (19 Nov 2017 06:02) (95/64 - 113/78)  BP(mean): --  RR: 16 (19 Nov 2017 06:02) (16 - 18)  SpO2: 97% (19 Nov 2017 06:02) (95% - 99%)    I&O's Summary    17 Nov 2017 07:01  -  18 Nov 2017 07:00  --------------------------------------------------------  IN: 480 mL / OUT: 0 mL / NET: 480 mL    18 Nov 2017 07:01  -  19 Nov 2017 06:07  --------------------------------------------------------  IN: 640 mL / OUT: 0 mL / NET: 640 mL    PHYSICAL EXAM:  Neurological: AAOx3, FC, speech coherent  CNII-XII: EOM intact, PERRL, face symmetric, tongue midline  Motor: MAEx4 5/5 UE and LE B/L  SILT throughout         [x] warm well perfused; capillary refill <3 seconds   Incision/Wound: Tight compressive head dressing intact, dry, no drainage noted     TUBES/LINES:  [] Pressley  [] Lumbar Drain  [] Wound Drains  [] Others    DIET:  [] NPO  [x] Mechanical  [] Tube feeds    LABS:                        12.7   6.0   )-----------( 194      ( 18 Nov 2017 07:09 )             37.4     11-18    138  |  97  |  21  ----------------------------<  92  5.1   |  29  |  1.26    Ca    9.5      18 Nov 2017 07:09              CAPILLARY BLOOD GLUCOSE          Drug Levels: [] N/A    CSF Analysis: [] N/A      Allergies    No Known Allergies    Intolerances      MEDICATIONS:  Antibiotics:    Neuro:  acetaminophen   Tablet. 325 milliGRAM(s) Oral every 4 hours PRN  ALPRAZolam 1 milliGRAM(s) Oral two times a day  buPROPion . 75 milliGRAM(s) Oral daily  indomethacin 25 milliGRAM(s) Oral three times a day  methocarbamol 500 milliGRAM(s) Oral every 8 hours PRN  oxyCODONE    5 mG/acetaminophen 325 mG 1 Tablet(s) Oral every 4 hours PRN  oxyCODONE    5 mG/acetaminophen 325 mG 2 Tablet(s) Oral every 4 hours PRN  zolpidem 5 milliGRAM(s) Oral at bedtime PRN  zolpidem 5 milliGRAM(s) Oral at bedtime PRN    Anticoagulation:  enoxaparin Injectable 40 milliGRAM(s) SubCutaneous at bedtime    OTHER:  docusate sodium 100 milliGRAM(s) Oral three times a day  magnesium hydroxide Suspension 30 milliLiter(s) Oral daily PRN  metoprolol     tartrate 50 milliGRAM(s) Oral two times a day  senna 2 Tablet(s) Oral at bedtime PRN    IVF:    CULTURES:    RADIOLOGY & ADDITIONAL TESTS:      ASSESSMENT:  52y Male  scalp wound dehiscence, s/p left acoustic neuroma resection     CSF LEAK  No h/o HF  No pertinent family history in first degree relatives  Handoff  MEWS Score  Anxiety disorder  ADHD (attention deficit hyperactivity disorder)  HTN (hypertension)  Acoustic neuroma  CSF leak  Acoustic neuroma  HTN (hypertension)  CSF leak  S/P excision of acoustic neuroma  No significant past surgical history  WOUND CHECK  7    PLAN:  -pain control  -DVT prophylaxis: SCDS, SQ lovenox  -PT/OOB  -tight compression head wrap, assess for leakage  -continue home medications  -dispo pending: plan for home discharge tomorrow  -D/w Dr. Simms

## 2017-11-20 ENCOUNTER — TRANSCRIPTION ENCOUNTER (OUTPATIENT)
Age: 52
End: 2017-11-20

## 2017-11-20 VITALS
OXYGEN SATURATION: 98 % | TEMPERATURE: 98 F | SYSTOLIC BLOOD PRESSURE: 108 MMHG | RESPIRATION RATE: 17 BRPM | DIASTOLIC BLOOD PRESSURE: 72 MMHG | HEART RATE: 65 BPM

## 2017-11-20 PROCEDURE — 97161 PT EVAL LOW COMPLEX 20 MIN: CPT

## 2017-11-20 PROCEDURE — 80053 COMPREHEN METABOLIC PANEL: CPT

## 2017-11-20 PROCEDURE — 86901 BLOOD TYPING SEROLOGIC RH(D): CPT

## 2017-11-20 PROCEDURE — 85025 COMPLETE CBC W/AUTO DIFF WBC: CPT

## 2017-11-20 PROCEDURE — 96375 TX/PRO/DX INJ NEW DRUG ADDON: CPT

## 2017-11-20 PROCEDURE — 86900 BLOOD TYPING SEROLOGIC ABO: CPT

## 2017-11-20 PROCEDURE — 99285 EMERGENCY DEPT VISIT HI MDM: CPT | Mod: 25

## 2017-11-20 PROCEDURE — 85027 COMPLETE CBC AUTOMATED: CPT

## 2017-11-20 PROCEDURE — 86850 RBC ANTIBODY SCREEN: CPT

## 2017-11-20 PROCEDURE — 93005 ELECTROCARDIOGRAM TRACING: CPT

## 2017-11-20 PROCEDURE — 85730 THROMBOPLASTIN TIME PARTIAL: CPT

## 2017-11-20 PROCEDURE — 36415 COLL VENOUS BLD VENIPUNCTURE: CPT

## 2017-11-20 PROCEDURE — 85610 PROTHROMBIN TIME: CPT

## 2017-11-20 PROCEDURE — 80048 BASIC METABOLIC PNL TOTAL CA: CPT

## 2017-11-20 PROCEDURE — 70450 CT HEAD/BRAIN W/O DYE: CPT

## 2017-11-20 PROCEDURE — 96374 THER/PROPH/DIAG INJ IV PUSH: CPT

## 2017-11-20 RX ORDER — ACETAMINOPHEN 500 MG
1000 TABLET ORAL ONCE
Qty: 0 | Refills: 0 | Status: COMPLETED | OUTPATIENT
Start: 2017-11-20 | End: 2017-11-20

## 2017-11-20 RX ORDER — ASPIRIN/CALCIUM CARB/MAGNESIUM 324 MG
1 TABLET ORAL
Qty: 0 | Refills: 0 | COMMUNITY

## 2017-11-20 RX ORDER — SODIUM CHLORIDE 9 MG/ML
1 INJECTION INTRAMUSCULAR; INTRAVENOUS; SUBCUTANEOUS
Qty: 0 | Refills: 0 | COMMUNITY
Start: 2017-11-20

## 2017-11-20 RX ADMIN — Medication 50 MILLIGRAM(S): at 05:25

## 2017-11-20 RX ADMIN — Medication 1 MILLIGRAM(S): at 05:25

## 2017-11-20 RX ADMIN — Medication 25 MILLIGRAM(S): at 06:25

## 2017-11-20 RX ADMIN — Medication 25 MILLIGRAM(S): at 13:30

## 2017-11-20 RX ADMIN — BUPROPION HYDROCHLORIDE 75 MILLIGRAM(S): 150 TABLET, EXTENDED RELEASE ORAL at 05:25

## 2017-11-20 RX ADMIN — Medication 100 MILLIGRAM(S): at 05:25

## 2017-11-20 RX ADMIN — METHOCARBAMOL 500 MILLIGRAM(S): 500 TABLET, FILM COATED ORAL at 05:25

## 2017-11-20 RX ADMIN — Medication 25 MILLIGRAM(S): at 05:25

## 2017-11-20 RX ADMIN — Medication 100 MILLIGRAM(S): at 13:30

## 2017-11-20 RX ADMIN — Medication 1000 MILLIGRAM(S): at 06:50

## 2017-11-20 RX ADMIN — Medication 400 MILLIGRAM(S): at 06:37

## 2017-11-20 NOTE — DISCHARGE NOTE ADULT - MEDICATION SUMMARY - MEDICATIONS TO TAKE
I will START or STAY ON the medications listed below when I get home from the hospital:    indomethacin 25 mg oral capsule  -- 1 cap(s) by mouth 3 times a day  -- Indication: For Pain    oxyCODONE-acetaminophen 5 mg-325 mg oral tablet  -- Take 1 tab by mouth every 4 hours for moderate pain. Take 2 tab(s) by mouth every 4 hours as needed for severe Pain (4 - 6) MDD:12 tabs  -- Indication: For Headache    Xanax 1 mg oral tablet  -- 1 tab(s) by mouth 2 times a day  -- Indication: For Anxiety    Metoprolol Tartrate 50 mg oral tablet  -- 1 tab(s) by mouth 2 times a day  -- Indication: For Hypertension    Vyvanse 30 mg oral capsule  -- 1 cap(s) by mouth once a day (in the morning)  -- Indication: For SNS stimulant    docusate sodium 100 mg oral capsule  -- 1 cap(s) by mouth 3 times a day  -- Indication: For Stool softener    senna oral tablet  -- 2 tab(s) by mouth once a day (at bedtime)  -- Indication: For Stool softener    sodium chloride 1 g oral tablet  -- 1 gram(s) by mouth every 8 hours  -- Indication: For Hyponatremia    buPROPion 150 mg/24 hours (XL) oral tablet, extended release  -- 1 tab(s) by mouth every 24 hours  -- Indication: For Smoking cessation

## 2017-11-20 NOTE — DISCHARGE NOTE ADULT - CARE PROVIDER_API CALL
Kaushik Simms), Neurological Surgery  [address]  [address]  [place] [address]  Phone: [phone]  Fax: [phone]

## 2017-11-20 NOTE — DISCHARGE NOTE ADULT - PLAN OF CARE
Resume normal activities of daily living -Keep compressive head wrap until you see  in office on Wednesday 11/22/2017  -Call if you have fever greater than 101F, photophobia, neck pain, worsening headache, leaking from wound site  -No showering until instructed otherwise by . Only Sponge baths allowed  -Call the office for any questions, [phone]  -Take pain medications as needed. You may find stool softeners are helpful as opioids can make you constipated  -No heavy lifting or straining. Refrain from vigorous exercise

## 2017-11-20 NOTE — PROGRESS NOTE ADULT - SUBJECTIVE AND OBJECTIVE BOX
HPI:  53 y/o male pmhx craniotomy and resection of left acoustic neuroma last month, recently discharged after observation of wound drainage now presents today reporting incision site drainage and mild headache. Pt reports these symptoms started 1 day ago. He denies any falls or trauma. Denies any nausea or vomiting. (16 Nov 2017 14:59)    OVERNIGHT EVENTS: Pt reports mild headache overnight. Compressive head wrap loose overnight and new fitted dressing applied. Pt denies acute weakness/loss of sensation of extremities.    Vital Signs Last 24 Hrs  T(C): 36.4 (19 Nov 2017 21:22), Max: 37.1 (19 Nov 2017 17:55)  T(F): 97.5 (19 Nov 2017 21:22), Max: 98.8 (19 Nov 2017 17:55)  HR: 64 (19 Nov 2017 21:22) (64 - 78)  BP: 105/69 (19 Nov 2017 21:22) (104/72 - 105/69)  BP(mean): --  RR: 16 (19 Nov 2017 21:22) (16 - 16)  SpO2: 97% (19 Nov 2017 21:22) (97% - 99%)    I&O's Summary    19 Nov 2017 07:01  -  20 Nov 2017 07:00  --------------------------------------------------------  IN: 540 mL / OUT: 0 mL / NET: 540 mL    PHYSICAL EXAM:  Neurological: AAOx3, FC, speech coherent  CNII-XII: EOM intact, PERRL, face symmetric, tongue midline  Motor: MAEx4 5/5 UE and LE B/L  SILT throughout         [x] warm well perfused; capillary refill <3 seconds   Incision/Wound: Tight compressive head dressing intact, dry, no drainage noted     TUBES/LINES:  [] Pressley  [] Lumbar Drain  [] Wound Drains  [] Others    DIET:  [] NPO  [x] Mechanical  [] Tube feeds    LABS:                        12.2   6.1   )-----------( 189      ( 19 Nov 2017 06:46 )             36.4     11-19    133<L>  |  96  |  22  ----------------------------<  100<H>  4.4   |  29  |  1.07    Ca    8.9      19 Nov 2017 06:46      CAPILLARY BLOOD GLUCOSE      Drug Levels: [] N/A    CSF Analysis: [] N/A      Allergies    No Known Allergies    Intolerances      MEDICATIONS:  Antibiotics:    Neuro:  acetaminophen   Tablet. 325 milliGRAM(s) Oral every 4 hours PRN  ALPRAZolam 1 milliGRAM(s) Oral two times a day  buPROPion . 75 milliGRAM(s) Oral daily  diphenhydrAMINE   Capsule 25 milliGRAM(s) Oral every 4 hours PRN  indomethacin 25 milliGRAM(s) Oral three times a day  methocarbamol 500 milliGRAM(s) Oral every 8 hours PRN  oxyCODONE    5 mG/acetaminophen 325 mG 1 Tablet(s) Oral every 4 hours PRN  oxyCODONE    5 mG/acetaminophen 325 mG 2 Tablet(s) Oral every 4 hours PRN  zolpidem 5 milliGRAM(s) Oral at bedtime PRN  zolpidem 5 milliGRAM(s) Oral at bedtime PRN    Anticoagulation:  enoxaparin Injectable 40 milliGRAM(s) SubCutaneous at bedtime    OTHER:  docusate sodium 100 milliGRAM(s) Oral three times a day  magnesium hydroxide Suspension 30 milliLiter(s) Oral daily PRN  metoprolol     tartrate 50 milliGRAM(s) Oral two times a day  senna 2 Tablet(s) Oral at bedtime PRN    IVF:    CULTURES:    RADIOLOGY & ADDITIONAL TESTS:    ASSESSMENT:  52y Male with scalp wound dehiscence, s/p left acoustic neuroma resection     CSF LEAK  No h/o HF  No pertinent family history in first degree relatives  Handoff  MEWS Score  Anxiety disorder  ADHD (attention deficit hyperactivity disorder)  HTN (hypertension)  Acoustic neuroma  CSF leak  Acoustic neuroma  HTN (hypertension)  CSF leak  S/P excision of acoustic neuroma  No significant past surgical history  WOUND CHECK  7      PLAN:  -pain control  -DVT prophylaxis: SCDS, SQ lovenox  -PT/OOB  -tight compression head wrap, assess for leakage  -continue home medications  -dispo pending: plan for home discharge today  -D/w Dr. Simms HPI:  51 y/o male pmhx craniotomy and resection of left acoustic neuroma last month, recently discharged after observation of wound drainage now presents today reporting incision site drainage and mild headache. Pt reports these symptoms started 1 day ago. He denies any falls or trauma. Denies any nausea or vomiting. (16 Nov 2017 14:59)    OVERNIGHT EVENTS: Pt reports mild headache overnight. Compressive head wrap loose overnight and new fitted dressing applied. Pt denies acute weakness/loss of sensation of extremities.    Vital Signs Last 24 Hrs  T(C): 36.4 (19 Nov 2017 21:22), Max: 37.1 (19 Nov 2017 17:55)  T(F): 97.5 (19 Nov 2017 21:22), Max: 98.8 (19 Nov 2017 17:55)  HR: 64 (19 Nov 2017 21:22) (64 - 78)  BP: 105/69 (19 Nov 2017 21:22) (104/72 - 105/69)  BP(mean): --  RR: 16 (19 Nov 2017 21:22) (16 - 16)  SpO2: 97% (19 Nov 2017 21:22) (97% - 99%)    I&O's Summary    19 Nov 2017 07:01  -  20 Nov 2017 07:00  --------------------------------------------------------  IN: 540 mL / OUT: 0 mL / NET: 540 mL    PHYSICAL EXAM:  Neurological: AAOx3, FC, speech coherent  CNII-XII: EOM intact, PERRL, face symmetric, tongue midline  Motor: MAEx4 5/5 UE and LE B/L  SILT throughout         [x] warm well perfused; capillary refill <3 seconds   Incision/Wound: Tight compressive head dressing intact, dry, no drainage noted     TUBES/LINES:  [] Pressley  [] Lumbar Drain  [] Wound Drains  [] Others    DIET:  [] NPO  [x] Mechanical  [] Tube feeds    LABS:                        12.2   6.1   )-----------( 189      ( 19 Nov 2017 06:46 )             36.4     11-19    133<L>  |  96  |  22  ----------------------------<  100<H>  4.4   |  29  |  1.07    Ca    8.9      19 Nov 2017 06:46      CAPILLARY BLOOD GLUCOSE      Drug Levels: [] N/A    CSF Analysis: [] N/A      Allergies    No Known Allergies    Intolerances      MEDICATIONS:  Antibiotics:    Neuro:  acetaminophen   Tablet. 325 milliGRAM(s) Oral every 4 hours PRN  ALPRAZolam 1 milliGRAM(s) Oral two times a day  buPROPion . 75 milliGRAM(s) Oral daily  diphenhydrAMINE   Capsule 25 milliGRAM(s) Oral every 4 hours PRN  indomethacin 25 milliGRAM(s) Oral three times a day  methocarbamol 500 milliGRAM(s) Oral every 8 hours PRN  oxyCODONE    5 mG/acetaminophen 325 mG 1 Tablet(s) Oral every 4 hours PRN  oxyCODONE    5 mG/acetaminophen 325 mG 2 Tablet(s) Oral every 4 hours PRN  zolpidem 5 milliGRAM(s) Oral at bedtime PRN  zolpidem 5 milliGRAM(s) Oral at bedtime PRN    Anticoagulation:  enoxaparin Injectable 40 milliGRAM(s) SubCutaneous at bedtime    OTHER:  docusate sodium 100 milliGRAM(s) Oral three times a day  magnesium hydroxide Suspension 30 milliLiter(s) Oral daily PRN  metoprolol     tartrate 50 milliGRAM(s) Oral two times a day  senna 2 Tablet(s) Oral at bedtime PRN    IVF:    CULTURES:    RADIOLOGY & ADDITIONAL TESTS:    ASSESSMENT:  52y Male with scalp wound dehiscence, s/p left acoustic neuroma resection     CSF LEAK  No h/o HF  No pertinent family history in first degree relatives  Handoff  MEWS Score  Anxiety disorder  ADHD (attention deficit hyperactivity disorder)  HTN (hypertension)  Acoustic neuroma  CSF leak  Acoustic neuroma  HTN (hypertension)  CSF leak  S/P excision of acoustic neuroma  No significant past surgical history  WOUND CHECK  7      PLAN:  -pain control  -DVT prophylaxis: SCDS, SQ lovenox  -PT/OOB  -tight compression head wrap, assess for leakage  -continue home medications  -dispo pending: plan for home discharge today  -f/u with  in office on Wednesday 11/22/2017  -D/w Dr. Simms

## 2017-11-20 NOTE — DISCHARGE NOTE ADULT - PATIENT PORTAL LINK FT
“You can access the [facility] Patient Portal, offered by [facility], by registering with the following website: [email or web address]”

## 2017-11-20 NOTE — DISCHARGE NOTE ADULT - HOSPITAL COURSE
53 y/o male PMHx craniotomy and resection of left acoustic neuroma last month, recently discharged after observation of wound drainage presented on 11/16/2017, reporting incision site drainage and mild headache. Pt reports these symptoms started 1 day ago. He denies any falls or trauma. Denies any nausea or vomiting. Hospital course without wound leakage. Pt is stable for discharge with compressive head wrap. Patient is aware that he will follow-up with  in his office on Wednesday 11/22/2017.

## 2017-11-20 NOTE — DISCHARGE NOTE ADULT - MEDICATION SUMMARY - MEDICATIONS TO STOP TAKING
I will STOP taking the medications listed below when I get home from the hospital:    aspirin 81 mg oral tablet  -- 1 tab(s) by mouth once a day    acetaminophen 325 mg oral tablet  -- 2 tab(s) by mouth every 6 hours, As needed, Moderate Pain (4 - 6)    dexamethasone 2 mg oral tablet  -- 2 tab(s) by mouth 2 times a day 11/8-11/10   1 tab by mouth 2 times a day 11/11-11/13   1 tab by mouth once a day 11/14-11/15   -- It is very important that you take or use this exactly as directed.  Do not skip doses or discontinue unless directed by your doctor.  Obtain medical advice before taking any non-prescription drugs as some may affect the action of this medication.  Take with food or milk.    HYDROmorphone 4 mg oral tablet  -- 1 tab(s) by mouth every 6 hours, As Needed 1/2 tab for Mod Pain, 1 tab for Severe Pain MDD:4 tabs  -- Caution federal law prohibits the transfer of this drug to any person other  than the person for whom it was prescribed.  May cause drowsiness.  Alcohol may intensify this effect.  Use care when operating dangerous machinery.  This prescription cannot be refilled.  Using more of this medication than prescribed may cause serious breathing problems.

## 2017-11-27 DIAGNOSIS — F90.9 ATTENTION-DEFICIT HYPERACTIVITY DISORDER, UNSPECIFIED TYPE: ICD-10-CM

## 2017-11-27 DIAGNOSIS — I10 ESSENTIAL (PRIMARY) HYPERTENSION: ICD-10-CM

## 2017-11-27 DIAGNOSIS — T81.32XA DISRUPTION OF INTERNAL OPERATION (SURGICAL) WOUND, NOT ELSEWHERE CLASSIFIED, INITIAL ENCOUNTER: ICD-10-CM

## 2017-11-27 DIAGNOSIS — E87.1 HYPO-OSMOLALITY AND HYPONATREMIA: ICD-10-CM

## 2017-11-27 DIAGNOSIS — T81.89XA OTHER COMPLICATIONS OF PROCEDURES, NOT ELSEWHERE CLASSIFIED, INITIAL ENCOUNTER: ICD-10-CM

## 2017-11-27 DIAGNOSIS — G96.0 CEREBROSPINAL FLUID LEAK: ICD-10-CM

## 2017-11-27 DIAGNOSIS — F41.9 ANXIETY DISORDER, UNSPECIFIED: ICD-10-CM

## 2017-12-23 LAB
CULTURE RESULTS: SIGNIFICANT CHANGE UP
SPECIMEN SOURCE: SIGNIFICANT CHANGE UP

## 2020-12-16 NOTE — DISCHARGE NOTE ADULT - CARE PLAN
1. Have you had increased asthma symptoms (chest tightness, increased cough,         wheezing or felt short of breath) in the past week? No    2. Have you had a marked increase in allergy symptoms(itchy eyes or nose, sneezing, runny nose, post nasal drip or throat clearing) in the past week? No    3. Do you have a cold, respiratory tract infection, or flu-like symptoms? No    4. Did you have any problems such as increased allergy or asthma symptoms, hives or generalized itching within 12 hours of receiving your allergy injection or swelling that persisted into the next day? No    5. Are you on any new medications such as eye drops, blood pressure or migraine medication?  No    Please specify: na    6. Patient was seen by allergist in the last 12 months?  Yes    7. Are you taking your allergy medicine as prescribed? Yes    8. Do you have your Epi kit with you? Yes    9. Epi expiration date: 5/21     Staff notes:  Pt is doing well.    
Principal Discharge DX:	CSF leak  Goal:	Resume normal activities of daily living  Instructions for follow-up, activity and diet:	-Keep compressive head wrap until you see  in office on Wednesday 11/22/2017  -Call if you have fever greater than 101F, photophobia, neck pain, worsening headache, leaking from wound site  -No showering until instructed otherwise by . Only Sponge baths allowed  -Call the office for any questions, [phone]  -Take pain medications as needed. You may find stool softeners are helpful as opioids can make you constipated  -No heavy lifting or straining. Refrain from vigorous exercise

## 2023-03-20 PROBLEM — Z00.00 ENCOUNTER FOR PREVENTIVE HEALTH EXAMINATION: Status: ACTIVE | Noted: 2023-03-20

## 2023-07-18 ENCOUNTER — APPOINTMENT (OUTPATIENT)
Dept: NEUROLOGY | Facility: CLINIC | Age: 58
End: 2023-07-18

## 2023-07-18 NOTE — DISCHARGE NOTE ADULT - THE PATIENT HAS
Action Requested: Summary for Provider     []  Critical Lab, Recommendations Needed  [] Need Additional Advice  []   FYI    []   Need Orders  [] Need Medications Sent to Pharmacy  []  Other     SUMMARY: Appt scheduled for Mon 5/13/19 with Dr CRUZ for symptom no difficulties This was a shared visit with the NARCISA. I reviewed and verified the documentation and independently performed the documented:

## 2024-01-29 NOTE — PHYSICAL THERAPY INITIAL EVALUATION ADULT - PRECAUTIONS/LIMITATIONS, REHAB EVAL
no known precautions/limitations This was a shared visit with the IKE. I reviewed and verified the documentation.